# Patient Record
Sex: FEMALE | Race: BLACK OR AFRICAN AMERICAN | Employment: OTHER | URBAN - METROPOLITAN AREA
[De-identification: names, ages, dates, MRNs, and addresses within clinical notes are randomized per-mention and may not be internally consistent; named-entity substitution may affect disease eponyms.]

---

## 2017-05-01 ENCOUNTER — HOSPITAL ENCOUNTER (OUTPATIENT)
Dept: MAMMOGRAPHY | Age: 67
Discharge: HOME | End: 2017-05-01
Attending: NURSE PRACTITIONER
Payer: MEDICARE

## 2017-05-01 DIAGNOSIS — Z12.31 VISIT FOR SCREENING MAMMOGRAM: ICD-10-CM

## 2017-05-01 PROCEDURE — 77063 BREAST TOMOSYNTHESIS BI: CPT

## 2018-04-26 ENCOUNTER — HOSPITAL ENCOUNTER (INPATIENT)
Age: 68
LOS: 4 days | Discharge: HOME | DRG: 305 | End: 2018-04-30
Attending: EMERGENCY MEDICINE | Admitting: HOSPITALIST
Payer: MEDICARE

## 2018-04-26 DIAGNOSIS — I10 HTN (HYPERTENSION), MALIGNANT: Primary | ICD-10-CM

## 2018-04-26 DIAGNOSIS — I16.0 HYPERTENSIVE URGENCY: ICD-10-CM

## 2018-04-26 LAB
ALBUMIN SERPL-MCNC: 3.7 G/DL (ref 3.4–5)
ALBUMIN/GLOB SERPL: 0.9 {RATIO} (ref 0.8–1.7)
ALP SERPL-CCNC: 138 U/L (ref 45–117)
ALT SERPL-CCNC: 40 U/L (ref 13–56)
ANION GAP SERPL CALC-SCNC: 6 MMOL/L (ref 3–18)
AST SERPL-CCNC: 41 U/L (ref 15–37)
BASOPHILS # BLD: 0 K/UL (ref 0–0.1)
BASOPHILS NFR BLD: 0 % (ref 0–2)
BILIRUB SERPL-MCNC: 0.7 MG/DL (ref 0.2–1)
BUN SERPL-MCNC: 10 MG/DL (ref 7–18)
BUN/CREAT SERPL: 12 (ref 12–20)
CALCIUM SERPL-MCNC: 9 MG/DL (ref 8.5–10.1)
CHLORIDE SERPL-SCNC: 105 MMOL/L (ref 100–108)
CO2 SERPL-SCNC: 30 MMOL/L (ref 21–32)
CREAT SERPL-MCNC: 0.83 MG/DL (ref 0.6–1.3)
DIFFERENTIAL METHOD BLD: ABNORMAL
EOSINOPHIL # BLD: 0.2 K/UL (ref 0–0.4)
EOSINOPHIL NFR BLD: 4 % (ref 0–5)
ERYTHROCYTE [DISTWIDTH] IN BLOOD BY AUTOMATED COUNT: 13.9 % (ref 11.6–14.5)
GLOBULIN SER CALC-MCNC: 4.1 G/DL (ref 2–4)
GLUCOSE SERPL-MCNC: 112 MG/DL (ref 74–99)
HCT VFR BLD AUTO: 43 % (ref 35–45)
HGB BLD-MCNC: 14.4 G/DL (ref 12–16)
LYMPHOCYTES # BLD: 2 K/UL (ref 0.9–3.6)
LYMPHOCYTES NFR BLD: 31 % (ref 21–52)
MCH RBC QN AUTO: 26.5 PG (ref 24–34)
MCHC RBC AUTO-ENTMCNC: 33.5 G/DL (ref 31–37)
MCV RBC AUTO: 79 FL (ref 74–97)
MONOCYTES # BLD: 0.4 K/UL (ref 0.05–1.2)
MONOCYTES NFR BLD: 7 % (ref 3–10)
NEUTS SEG # BLD: 3.6 K/UL (ref 1.8–8)
NEUTS SEG NFR BLD: 58 % (ref 40–73)
PLATELET # BLD AUTO: 243 K/UL (ref 135–420)
PMV BLD AUTO: 10 FL (ref 9.2–11.8)
POTASSIUM SERPL-SCNC: 4.4 MMOL/L (ref 3.5–5.5)
PROT SERPL-MCNC: 7.8 G/DL (ref 6.4–8.2)
RBC # BLD AUTO: 5.44 M/UL (ref 4.2–5.3)
SODIUM SERPL-SCNC: 141 MMOL/L (ref 136–145)
TSH SERPL DL<=0.05 MIU/L-ACNC: 1.14 UIU/ML (ref 0.36–3.74)
WBC # BLD AUTO: 6.2 K/UL (ref 4.6–13.2)

## 2018-04-26 PROCEDURE — 80053 COMPREHEN METABOLIC PANEL: CPT | Performed by: EMERGENCY MEDICINE

## 2018-04-26 PROCEDURE — 85025 COMPLETE CBC W/AUTO DIFF WBC: CPT | Performed by: EMERGENCY MEDICINE

## 2018-04-26 PROCEDURE — 65660000004 HC RM CVT STEPDOWN

## 2018-04-26 PROCEDURE — 93005 ELECTROCARDIOGRAM TRACING: CPT

## 2018-04-26 PROCEDURE — 99285 EMERGENCY DEPT VISIT HI MDM: CPT

## 2018-04-26 PROCEDURE — 74011250637 HC RX REV CODE- 250/637: Performed by: EMERGENCY MEDICINE

## 2018-04-26 PROCEDURE — 84443 ASSAY THYROID STIM HORMONE: CPT | Performed by: EMERGENCY MEDICINE

## 2018-04-26 RX ORDER — SODIUM CHLORIDE 0.9 % (FLUSH) 0.9 %
5-10 SYRINGE (ML) INJECTION EVERY 8 HOURS
Status: DISCONTINUED | OUTPATIENT
Start: 2018-04-27 | End: 2018-04-30 | Stop reason: HOSPADM

## 2018-04-26 RX ORDER — SODIUM CHLORIDE 0.9 % (FLUSH) 0.9 %
5-10 SYRINGE (ML) INJECTION AS NEEDED
Status: DISCONTINUED | OUTPATIENT
Start: 2018-04-26 | End: 2018-04-30 | Stop reason: HOSPADM

## 2018-04-26 RX ORDER — HYDRALAZINE HYDROCHLORIDE 25 MG/1
25 TABLET, FILM COATED ORAL
Status: COMPLETED | OUTPATIENT
Start: 2018-04-26 | End: 2018-04-26

## 2018-04-26 RX ORDER — AMLODIPINE BESYLATE 5 MG/1
5 TABLET ORAL
Status: COMPLETED | OUTPATIENT
Start: 2018-04-26 | End: 2018-04-26

## 2018-04-26 RX ORDER — ONDANSETRON 2 MG/ML
4 INJECTION INTRAMUSCULAR; INTRAVENOUS
Status: DISCONTINUED | OUTPATIENT
Start: 2018-04-26 | End: 2018-04-30 | Stop reason: HOSPADM

## 2018-04-26 RX ORDER — HEPARIN SODIUM 5000 [USP'U]/ML
5000 INJECTION, SOLUTION INTRAVENOUS; SUBCUTANEOUS EVERY 8 HOURS
Status: DISCONTINUED | OUTPATIENT
Start: 2018-04-27 | End: 2018-04-30 | Stop reason: HOSPADM

## 2018-04-26 RX ORDER — HYDRALAZINE HYDROCHLORIDE 50 MG/1
100 TABLET, FILM COATED ORAL
Status: COMPLETED | OUTPATIENT
Start: 2018-04-26 | End: 2018-04-26

## 2018-04-26 RX ORDER — ACETAMINOPHEN 325 MG/1
650 TABLET ORAL
Status: DISCONTINUED | OUTPATIENT
Start: 2018-04-26 | End: 2018-04-30 | Stop reason: HOSPADM

## 2018-04-26 RX ORDER — HYDRALAZINE HYDROCHLORIDE 20 MG/ML
10 INJECTION INTRAMUSCULAR; INTRAVENOUS
Status: DISCONTINUED | OUTPATIENT
Start: 2018-04-26 | End: 2018-04-30 | Stop reason: HOSPADM

## 2018-04-26 RX ORDER — METOPROLOL TARTRATE 25 MG/1
25 TABLET, FILM COATED ORAL 2 TIMES DAILY
Status: DISCONTINUED | OUTPATIENT
Start: 2018-04-27 | End: 2018-04-27

## 2018-04-26 RX ORDER — LOSARTAN POTASSIUM 50 MG/1
100 TABLET ORAL DAILY
Status: DISCONTINUED | OUTPATIENT
Start: 2018-04-27 | End: 2018-04-30 | Stop reason: HOSPADM

## 2018-04-26 RX ADMIN — HYDRALAZINE HYDROCHLORIDE 100 MG: 50 TABLET, FILM COATED ORAL at 18:23

## 2018-04-26 RX ADMIN — Medication 10 ML: at 23:26

## 2018-04-26 RX ADMIN — HYDRALAZINE HYDROCHLORIDE 25 MG: 25 TABLET ORAL at 12:16

## 2018-04-26 RX ADMIN — AMLODIPINE BESYLATE 5 MG: 5 TABLET ORAL at 18:23

## 2018-04-26 RX ADMIN — HYDRALAZINE HYDROCHLORIDE 100 MG: 50 TABLET, FILM COATED ORAL at 14:07

## 2018-04-26 RX ADMIN — AMLODIPINE BESYLATE 5 MG: 5 TABLET ORAL at 19:54

## 2018-04-26 NOTE — ED TRIAGE NOTES
Pt complaining of high blood pressure. States she saw her PCP yesterday and was started on hydralizine. Pt also takes metoprolol 2 x a day  Pt also complaining of light headedness.

## 2018-04-26 NOTE — IP AVS SNAPSHOT
[address] [address] [place] Patient: Esther Hewitt MRN: [id number] A check victor m indicates which time of day the medication should be taken. My Medications START taking these medications Instructions Each Dose to Equal  
 Morning Noon Evening Bedtime  
 amLODIPine 10 mg tablet Commonly known as:  Zina Chamita Start taking on:  5/1/2018 Your last dose was: Your next dose is: Take 1 Tab by mouth daily. 10 mg  
    
   
   
   
  
 aspirin 81 mg chewable tablet Start taking on:  5/1/2018 Your last dose was: Your next dose is: Take 1 Tab by mouth daily. 81 mg  
    
   
   
   
  
 atorvastatin 40 mg tablet Commonly known as:  LIPITOR Your last dose was: Your next dose is: Take 1 Tab by mouth nightly. 40 mg  
    
   
   
   
  
 famotidine 20 mg tablet Commonly known as:  PEPCID Your last dose was: Your next dose is: Take 1 Tab by mouth two (2) times a day. 20 mg  
    
   
   
   
  
 hydrALAZINE 100 mg tablet Commonly known as:  APRESOLINE Your last dose was: Your next dose is: Take 1 Tab by mouth three (3) times daily. 100 mg  
    
   
   
   
  
 losartan 100 mg tablet Commonly known as:  COZAAR Start taking on:  5/1/2018 Your last dose was: Your next dose is: Take 1 Tab by mouth daily. 100 mg  
    
   
   
   
  
 minoxidil 2.5 mg tablet Commonly known as:  Carmelina Mojica Start taking on:  5/1/2018 Your last dose was: Your next dose is: Take 1 Tab by mouth daily. 2.5 mg  
    
   
   
   
  
 nitroglycerin 0.4 mg SL tablet Commonly known as:  NITROSTAT Your last dose was:     
   
Your next dose is:    
   
   
 1 Tab by SubLINGual route every five (5) minutes as needed for Chest Pain.  
 0.4 mg  
    
   
   
   
  
 spironolactone 25 mg tablet Commonly known as:  ALDACTONE Start taking on:  5/1/2018 Your last dose was: Your next dose is: Take 1 Tab by mouth daily. 25 mg Where to Get Your Medications These medications were sent to [address] [place] [address] [address] [facility].[place] [address] Phone:  [phone]  
  amLODIPine 10 mg tablet  
 aspirin 81 mg chewable tablet  
 atorvastatin 40 mg tablet  
 famotidine 20 mg tablet  
 hydrALAZINE 100 mg tablet  
 losartan 100 mg tablet  
 minoxidil 2.5 mg tablet  
 nitroglycerin 0.4 mg SL tablet  
 spironolactone 25 mg tablet

## 2018-04-26 NOTE — ED PROVIDER NOTES
EMERGENCY DEPARTMENT HISTORY AND PHYSICAL EXAM    10:58 AM      Date: 4/26/2018  Patient Name: Marta Jalloh    History of Presenting Illness     Chief Complaint   Patient presents with    Hypertension         History Provided By: Patient    Additional History (Context): Marta Jalloh is a 75 y.o. female with No significant past medical history who presents with intermittent global HTN for the past day. Pt reports that she went to the doctor yesterday and had a systolic blood pressure of over 200. She feels okay right now, but states that  she had experienced lightheadedness earlier today. The pt is afraid to take her medication because she believes it could exacerbate her HTN. Pt denies HA and visual disturbance. No other concerns, modifying factors, or symptoms were expressed by the pt at this time. PCP: Vikki Clark NP    Chief Complaint: HTN  Duration: 1 Days  Timing:  intermittent  Location: Global  Quality: N/a  Severity: N/a  Modifying Factors: None  Associated Symptoms: lightheadedness          Past History     Past Medical History:  No past medical history on file. Past Surgical History:  No past surgical history on file. Family History:  Family History   Problem Relation Age of Onset    Breast Cancer Maternal Aunt        Social History:  Social History   Substance Use Topics    Smoking status: Not on file    Smokeless tobacco: Not on file    Alcohol use Not on file       Allergies: Allergies   Allergen Reactions    Penicillin G Other (comments)    Sulfa (Sulfonamide Antibiotics) Other (comments)         Review of Systems     Review of Systems   Constitutional: Negative for diaphoresis and fever. Eyes: Negative for visual disturbance. Respiratory: Negative for shortness of breath. Cardiovascular: Negative for chest pain. Gastrointestinal: Negative for diarrhea, nausea and vomiting. Neurological: Positive for light-headedness. Negative for headaches.    All other systems reviewed and are negative. Physical Exam     Visit Vitals    BP (!) 220/71    Pulse 62    Temp 98 °F (36.7 °C)    Resp 18    Wt 106.1 kg (234 lb)    SpO2 99%       Physical Exam   Constitutional: She is oriented to person, place, and time. She appears well-developed. HENT:   Head: Normocephalic and atraumatic. Eyes: EOM are normal. Pupils are equal, round, and reactive to light. Neck: Normal range of motion. Neck supple. Cardiovascular: Normal rate, regular rhythm and normal heart sounds. Exam reveals no friction rub. No murmur heard. Pulmonary/Chest: Effort normal and breath sounds normal. No respiratory distress. She has no wheezes. Abdominal: Soft. She exhibits no distension. There is no tenderness. There is no rebound and no guarding. Musculoskeletal: Normal range of motion. Neurological: She is alert and oriented to person, place, and time. Skin: Skin is warm and dry. Psychiatric: She has a normal mood and affect. Her behavior is normal. Thought content normal.         Diagnostic Study Results      EKG shows sb at 48; nl axis. Nl int. No yfn/d no hypertrophy. TWI I L, v5/6  likley LVH. No prior EKG. Medical Decision Making      patient General care offices yesterday for hypertension. She notes that it is persistently high she said she felt mild lightheadedness earlier today. She has no chest pain or shortness of breath no abdominal pain. EKG shows a sinus bradycardia with likely some LVH but has not actually showing up on EKG. No priors for comparison. She had blood work done by primary yesterday     3:40 PM Progress note: Pt is feeling better after second dose of Hydralazine. 4:24 PM Consult: I discussed care with JAI Billings. It was a standard discussion including patient history, chief complaint, available diagnostic results, and predicted treatment course. I discussed with Dr. Mitchell Bustos and the similar results in her office.  She agrees with doubling hydralazine to 100 mg PO.    givne 25 hydralazine arrival; tooker her own losartan  givne 100mg po hydralazine   Then 5pm took her own 25 my hydralazine and her own 25mg lopressor. In addition to her losartan, hydralazine, and lopressor; pt was givne an additional 10 of norvasc and 225 (100x2 and 25 of hydralazine) will dw/ hospitalist for admission      Diagnosis     No diagnosis found. _______________________________    Attestations:  Scribe Attestation     Abhishek Grvoes acting as a scribe for and in the presence of Latrell Oconnor MD      April 26, 2018 at 10:58 AM       Provider Attestation:      I personally performed the services described in the documentation, reviewed the documentation, as recorded by the scribe in my presence, and it accurately and completely records my words and actions.  April 26, 2018 at 10:58 AM - Latrell Oconnor MD    _______________________________

## 2018-04-26 NOTE — ED NOTES
Patient remains hypertensive. Instructed by MD to give home meds which patient has. Will continue to monitor.

## 2018-04-26 NOTE — IP AVS SNAPSHOT
[address] [address] [place] Patient: Leopold Curd MRN: [id number] About your hospitalization You were admitted on:  April 26, 2018 You last received care in the:  [facility] You were discharged on:  April 30, 2018 Why you were hospitalized Your primary diagnosis was:  Not on File Your diagnoses also included:  Htn (Hypertension), Malignant, Hypertensive Urgency Follow-up Information Follow up With Details Comments Contact Info Refugio Marco, [address] [id number] [phone] Discharge Orders None A check victor m indicates which time of day the medication should be taken. My Medications START taking these medications Instructions Each Dose to Equal  
 Morning Noon Evening Bedtime  
 amLODIPine 10 mg tablet Commonly known as:  Earnest Russo Start taking on:  5/1/2018 Your last dose was: Your next dose is: Take 1 Tab by mouth daily. 10 mg  
    
   
   
   
  
 aspirin 81 mg chewable tablet Start taking on:  5/1/2018 Your last dose was: Your next dose is: Take 1 Tab by mouth daily. 81 mg  
    
   
   
   
  
 atorvastatin 40 mg tablet Commonly known as:  LIPITOR Your last dose was: Your next dose is: Take 1 Tab by mouth nightly. 40 mg  
    
   
   
   
  
 famotidine 20 mg tablet Commonly known as:  PEPCID Your last dose was: Your next dose is: Take 1 Tab by mouth two (2) times a day. 20 mg  
    
   
   
   
  
 hydrALAZINE 100 mg tablet Commonly known as:  APRESOLINE Your last dose was: Your next dose is: Take 1 Tab by mouth three (3) times daily. 100 mg  
    
   
   
   
  
 losartan 100 mg tablet Commonly known as:  COZAAR Start taking on:  5/1/2018 Your last dose was: Your next dose is: Take 1 Tab by mouth daily. 100 mg  
    
   
   
   
  
 minoxidil 2.5 mg tablet Commonly known as:  Rebeca Brooking Start taking on:  5/1/2018 Your last dose was: Your next dose is: Take 1 Tab by mouth daily. 2.5 mg  
    
   
   
   
  
 nitroglycerin 0.4 mg SL tablet Commonly known as:  NITROSTAT Your last dose was: Your next dose is:    
   
   
 1 Tab by SubLINGual route every five (5) minutes as needed for Chest Pain. 0.4 mg  
    
   
   
   
  
 spironolactone 25 mg tablet Commonly known as:  ALDACTONE Start taking on:  5/1/2018 Your last dose was: Your next dose is: Take 1 Tab by mouth daily. 25 mg Where to Get Your Medications These medications were sent to [address] [place] [address] Phone:  [phone]  
  amLODIPine 10 mg tablet  
 aspirin 81 mg chewable tablet  
 atorvastatin 40 mg tablet  
 famotidine 20 mg tablet  
 hydrALAZINE 100 mg tablet  
 losartan 100 mg tablet  
 minoxidil 2.5 mg tablet  
 nitroglycerin 0.4 mg SL tablet  
 spironolactone 25 mg tablet Discharge Instructions DISCHARGE SUMMARY from Nurse PATIENT INSTRUCTIONS: 
 
After general anesthesia or intravenous sedation, for 24 hours or while taking prescription Narcotics: · Limit your activities · Do not drive and operate hazardous machinery · Do not make important personal or business decisions · Do  not drink alcoholic beverages · If you have not urinated within 8 hours after discharge, please contact your surgeon on call. Report the following to your surgeon: 
· Excessive pain, swelling, redness or odor of or around the surgical area · Temperature over 100.5 · Nausea and vomiting lasting longer than 4 hours or if unable to take medications · Any signs of decreased circulation or nerve impairment to extremity: change in color, persistent  numbness, tingling, coldness or increase pain · Any questions What to do at Home: 
Recommended activity: Activity as tolerated If you experience any of the following symptoms chest pain, shortness of breath please follow up in ER 
 
*  Please give a list of your current medications to your Primary Care Provider. *  Please update this list whenever your medications are discontinued, doses are 
    changed, or new medications (including over-the-counter products) are added. *  Please carry medication information at all times in case of emergency situations. These are general instructions for a healthy lifestyle: No smoking/ No tobacco products/ Avoid exposure to second hand smoke Surgeon General's Warning:  Quitting smoking now greatly reduces serious risk to your health. Obesity, smoking, and sedentary lifestyle greatly increases your risk for illness A healthy diet, regular physical exercise & weight monitoring are important for maintaining a healthy lifestyle You may be retaining fluid if you have a history of heart failure or if you experience any of the following symptoms:  Weight gain of 3 pounds or more overnight or 5 pounds in a week, increased swelling in our hands or feet or shortness of breath while lying flat in bed. Please call your doctor as soon as you notice any of these symptoms; do not wait until your next office visit. Recognize signs and symptoms of STROKE: 
 
F-face looks uneven A-arms unable to move or move unevenly S-speech slurred or non-existent T-time-call 911 as soon as signs and symptoms begin-DO NOT go Back to bed or wait to see if you get better-TIME IS BRAIN. Warning Signs of HEART ATTACK Call 911 if you have these symptoms: 
? Chest discomfort.  Most heart attacks involve discomfort in the center of the chest that lasts more than a few minutes, or that goes away and comes back. It can feel like uncomfortable pressure, squeezing, fullness, or pain. ? Discomfort in other areas of the upper body. Symptoms can include pain or discomfort in one or both arms, the back, neck, jaw, or stomach. ? Shortness of breath with or without chest discomfort. ? Other signs may include breaking out in a cold sweat, nausea, or lightheadedness. Don't wait more than five minutes to call [address]! Fast action can save your life. Calling 911 is almost always the fastest way to get lifesaving treatment. Emergency Medical Services staff can begin treatment when they arrive  up to an hour sooner than if someone gets to the hospital by car. The discharge information has been reviewed with the patient. The patient verbalized understanding. Discharge medications reviewed with the patient High Blood Pressure: Care Instructions Your Care Instructions If your blood pressure is usually above 140/90, you have high blood pressure, or hypertension. That means the top number is 140 or higher or the bottom number is 90 or higher, or both. Despite what a lot of people think, high blood pressure usually doesn't cause headaches or make you feel dizzy or lightheaded. It usually has no symptoms. But it does increase your risk for heart attack, stroke, and kidney or eye damage. The higher your blood pressure, the more your risk increases. Your doctor will give you a goal for your blood pressure. Your goal will be based on your health and your age. An example of a goal is to keep your blood pressure below 140/90. Lifestyle changes, such as eating healthy and being active, are always important to help lower blood pressure. You might also take medicine to reach your blood pressure goal. 
Follow-up care is a key part of your treatment and safety.  Be sure to make and go to all appointments, and call your doctor if you are having problems. It's also a good idea to know your test results and keep a list of the medicines you take. How can you care for yourself at home? Medical treatment · If you stop taking your medicine, your blood pressure will go back up. You may take one or more types of medicine to lower your blood pressure. Be safe with medicines. Take your medicine exactly as prescribed. Call your doctor if you think you are having a problem with your medicine. · Talk to your doctor before you start taking aspirin every day. Aspirin can help certain people lower their risk of a heart attack or stroke. But taking aspirin isn't right for everyone, because it can cause serious bleeding. · See your doctor regularly. You may need to see the doctor more often at first or until your blood pressure comes down. · If you are taking blood pressure medicine, talk to your doctor before you take decongestants or anti-inflammatory medicine, such as ibuprofen. Some of these medicines can raise blood pressure. · Learn how to check your blood pressure at home. Lifestyle changes · Stay at a healthy weight. This is especially important if you put on weight around the waist. Losing even 10 pounds can help you lower your blood pressure. · If your doctor recommends it, get more exercise. Walking is a good choice. Bit by bit, increase the amount you walk every day. Try for at least 30 minutes on most days of the week. You also may want to swim, bike, or do other activities. · Avoid or limit alcohol. Talk to your doctor about whether you can drink any alcohol. · Try to limit how much sodium you eat to less than 2,300 milligrams (mg) a day. Your doctor may ask you to try to eat less than 1,500 mg a day. · Eat plenty of fruits (such as bananas and oranges), vegetables, legumes, whole grains, and low-fat dairy products. · Lower the amount of saturated fat in your diet. Saturated fat is found in animal products such as milk, cheese, and meat. Limiting these foods may help you lose weight and also lower your risk for heart disease. · Do not smoke. Smoking increases your risk for heart attack and stroke. If you need help quitting, talk to your doctor about stop-smoking programs and medicines. These can increase your chances of quitting for good. When should you call for help? Call 911 anytime you think you may need emergency care. This may mean having symptoms that suggest that your blood pressure is causing a serious heart or blood vessel problem. Your blood pressure may be over 180/110. ? For example, call 911 if: 
? · You have symptoms of a heart attack. These may include: ¨ Chest pain or pressure, or a strange feeling in the chest. 
¨ Sweating. ¨ Shortness of breath. ¨ Nausea or vomiting. ¨ Pain, pressure, or a strange feeling in the back, neck, jaw, or upper belly or in one or both shoulders or arms. ¨ Lightheadedness or sudden weakness. ¨ A fast or irregular heartbeat. ? · You have symptoms of a stroke. These may include: 
¨ Sudden numbness, tingling, weakness, or loss of movement in your face, arm, or leg, especially on only one side of your body. ¨ Sudden vision changes. ¨ Sudden trouble speaking. ¨ Sudden confusion or trouble understanding simple statements. ¨ Sudden problems with walking or balance. ¨ A sudden, severe headache that is different from past headaches. ? · You have severe back or belly pain. ?Do not wait until your blood pressure comes down on its own. Get help right away. ?Call your doctor now or seek immediate care if: 
? · Your blood pressure is much higher than normal (such as 180/110 or higher), but you don't have symptoms. ? · You think high blood pressure is causing symptoms, such as: ¨ Severe headache. ¨ Blurry vision. ?Watch closely for changes in your health, and be sure to contact your doctor if: 
? · Your blood pressure measures 140/90 or higher at least 2 times. That means the top number is 140 or higher or the bottom number is 90 or higher, or both. ? · You think you may be having side effects from your blood pressure medicine. ? · Your blood pressure is usually normal, but it goes above normal at least 2 times. Where can you learn more? Go to http://steffany-florida.info/. Enter F173 in the search box to learn more about \"High Blood Pressure: Care Instructions. \" Current as of: September 21, 2016 Content Version: 11.4 © 3489-0131 [employer]. Care instructions adapted under license by [employer] (which disclaims liability or warranty for this information). If you have questions about a medical condition or this instruction, always ask your healthcare professional. [address] any warranty or liability for your use of this information. and appropriate educational materials and side effects teaching were provided. ___________________________________________________________________________________________________________________________________ classmarketshart Announcement We are excited to announce that we are making your provider's discharge notes available to you in [employer]. You will see these notes when they are completed and signed by the physician that discharged you from your recent hospital stay. If you have any questions or concerns about any information you see in [employer], please call the Health Information Department where you were seen or reach out to your Primary Care Provider for more information about your plan of care. Introducing hospitals & HEALTH SERVICES! [address] introduces [employer] patient portal. Now you can access parts of your medical record, email your doctor's office, and request medication refills online. 1. In your internet browser, go to [email or web address] 2. Click on the First Time User? Click Here link in the Sign In box. You will see the New Member Sign Up page. 3. Enter your [employer] Access Code exactly as it appears below. You will not need to use this code after youve completed the sign-up process. If you do not sign up before the expiration date, you must request a new code. · [employer] Access Code: [email or web address] Expires: 7/25/2018 10:26 AM 
 
4. Enter the last four digits of your Social Security Number (xxxx) and Date of Birth (mm/dd/yyyy) as indicated and click Submit. You will be taken to the next sign-up page. 5. Create a [employer] ID. This will be your [employer] login ID and cannot be changed, so think of one that is secure and easy to remember. 6. Create a [employer] password. You can change your password at any time. 7. Enter your Password Reset Question and Answer. This can be used at a later time if you forget your password. 8. Enter your e-mail address. You will receive e-mail notification when new information is available in [address] 9. Click Sign Up. You can now view and download portions of your medical record. 10. Click the Download Summary menu link to download a portable copy of your medical information. If you have questions, please visit the Frequently Asked Questions section of the [employer] website. Remember, [employer] is NOT to be used for urgent needs. For medical emergencies, dial 911. Now available from your iPhone and Android! Introducing Chito Gallo As a [facility] patient, I wanted to make you aware of our electronic visit tool called Chito Gallo. [facility] 24/7 allows you to connect within minutes with a medical provider 24 hours a day, seven days a week via a mobile device or tablet or logging into a secure website from your computer. You can access Chito Gallo from anywhere in the [place]. A virtual visit might be right for you when you have a simple condition and feel like you just dont want to get out of bed, or cant get away from work for an appointment, when your regular [facility] provider is not available (evenings, weekends or holidays), or when youre out of town and need minor care. Electronic visits cost only $49 and if the [place] 24/[employer] provider determines a prescription is needed to treat your condition, one can be electronically transmitted to a nearby pharmacy*. Please take a moment to enroll today if you have not already done so. The enrollment process is free and takes just a few minutes. To enroll, please download the "[employer] germán to your tablet or phone, or visit [email or web address] to enroll on your computer. And, as an [address] [place] [address] patient with a [employer] account, the results of your visits will be scanned into your electronic medical record and your primary care provider will be able to view the scanned results. We urge you to continue to see your regular [facility] provider for your ongoing medical care. And while your primary care provider may not be the one available when you seek a Tilckchristopherfin virtual visit, the peace of mind you get from getting a real diagnosis real time can be priceless. For more information on Tilckchristopherfin, view our Frequently Asked Questions (FAQs) at [email or web address]. Sincerely, 
 
Baudilio Tan MD 
Chief Medical Officer Raz Vera *:  certain medications cannot be prescribed via [employer] Unresulted Labs-Please follow up with your PCP about these lab tests Order Current Status DUPLEX RENAL ART/EDIE BILATERAL Preliminary result NUCLEAR STRESS TEST Preliminary result Providers Seen During Your Hospitalization Provider Specialty Primary office phone Faith Francisco MD Emergency Medicine [phone] Paramjit Villarreal MD Internal Medicine [phone] Dmitriy Marie MD Internal Medicine [phone] Lois Chatman MD [facility] [phone] Your Primary Care Physician (PCP) Primary Care Physician Office Phone Office Ashley Haile [phone] You are allergic to the following Allergen Reactions Penicillin G Other (comments) Sulfa (Sulfonamide Antibiotics) Other (comments) Recent Documentation Height Weight BMI  
  
  
 1.676 m 104.9 kg 37.32 kg/m2 Emergency Contacts Name Discharge Info Relation Home Work Mobile Velvet Conde CAREGIVER [3] Mother [14] [phone] Patient Belongings The following personal items are in your possession at time of discharge: 
  Dental Appliances: None  Visual Aid: None      Home Medications: None   Jewelry: Watch  Clothing: At bedside    Other Valuables: Cell Phone, Eyeglasses Please provide this summary of care documentation to your next provider. Signatures-by signing, you are acknowledging that this After Visit Summary has been reviewed with you and you have received a copy. Patient Signature:  ____________________________________________________________ Date:  ____________________________________________________________  
  
Elysia Pa Provider Signature:  ____________________________________________________________ Date:  ____________________________________________________________

## 2018-04-26 NOTE — Clinical Note
Status[de-identified] Inpatient [101] Type of Bed: Stepdown [17] Inpatient Hospitalization Certified Necessary for the Following Reasons: 3. Patient receiving treatment that can only be provided in an inpatient setting (further clarification in H&P documentation) Admitting Diagnosis: HTN (hypertension), malignant [421331] Admitting Physician: Lynn Van [[id number]] Attending Physician: Lynn Van [[id number]] Estimated Length of Stay: 2 Midnights Discharge Plan[de-identified] Home with Office Follow-up

## 2018-04-27 LAB
ALBUMIN SERPL-MCNC: 4 G/DL (ref 3.4–5)
ALBUMIN/GLOB SERPL: 0.9 {RATIO} (ref 0.8–1.7)
ALP SERPL-CCNC: 145 U/L (ref 45–117)
ALT SERPL-CCNC: 39 U/L (ref 13–56)
ANION GAP SERPL CALC-SCNC: 7 MMOL/L (ref 3–18)
APTT PPP: 30.2 SEC (ref 23–36.4)
AST SERPL-CCNC: 27 U/L (ref 15–37)
ATRIAL RATE: 48 BPM
ATRIAL RATE: 78 BPM
BILIRUB SERPL-MCNC: 0.6 MG/DL (ref 0.2–1)
BUN SERPL-MCNC: 8 MG/DL (ref 7–18)
BUN/CREAT SERPL: 9 (ref 12–20)
CALCIUM SERPL-MCNC: 9.2 MG/DL (ref 8.5–10.1)
CALCULATED P AXIS, ECG09: 37 DEGREES
CALCULATED P AXIS, ECG09: 59 DEGREES
CALCULATED R AXIS, ECG10: -3 DEGREES
CALCULATED R AXIS, ECG10: 1 DEGREES
CALCULATED T AXIS, ECG11: 129 DEGREES
CALCULATED T AXIS, ECG11: 45 DEGREES
CHLORIDE SERPL-SCNC: 105 MMOL/L (ref 100–108)
CHOLEST SERPL-MCNC: 245 MG/DL
CK MB CFR SERPL CALC: 0.7 % (ref 0–4)
CK MB CFR SERPL CALC: 0.9 % (ref 0–4)
CK MB CFR SERPL CALC: 1.1 % (ref 0–4)
CK MB SERPL-MCNC: 1.5 NG/ML (ref 5–25)
CK MB SERPL-MCNC: 1.7 NG/ML (ref 5–25)
CK MB SERPL-MCNC: 2 NG/ML (ref 5–25)
CK SERPL-CCNC: 180 U/L (ref 26–192)
CK SERPL-CCNC: 194 U/L (ref 26–192)
CK SERPL-CCNC: 207 U/L (ref 26–192)
CO2 SERPL-SCNC: 29 MMOL/L (ref 21–32)
CREAT SERPL-MCNC: 0.94 MG/DL (ref 0.6–1.3)
DIAGNOSIS, 93000: NORMAL
DIAGNOSIS, 93000: NORMAL
ERYTHROCYTE [DISTWIDTH] IN BLOOD BY AUTOMATED COUNT: 14 % (ref 11.6–14.5)
GLOBULIN SER CALC-MCNC: 4.4 G/DL (ref 2–4)
GLUCOSE SERPL-MCNC: 166 MG/DL (ref 74–99)
HCT VFR BLD AUTO: 45.8 % (ref 35–45)
HDLC SERPL-MCNC: 48 MG/DL (ref 40–60)
HDLC SERPL: 5.1 {RATIO} (ref 0–5)
HGB BLD-MCNC: 15.3 G/DL (ref 12–16)
LDLC SERPL CALC-MCNC: 169.4 MG/DL (ref 0–100)
LIPID PROFILE,FLP: ABNORMAL
MCH RBC QN AUTO: 26.5 PG (ref 24–34)
MCHC RBC AUTO-ENTMCNC: 33.4 G/DL (ref 31–37)
MCV RBC AUTO: 79.4 FL (ref 74–97)
P-R INTERVAL, ECG05: 154 MS
P-R INTERVAL, ECG05: 168 MS
PLATELET # BLD AUTO: 283 K/UL (ref 135–420)
PMV BLD AUTO: 9.9 FL (ref 9.2–11.8)
POTASSIUM SERPL-SCNC: 3.5 MMOL/L (ref 3.5–5.5)
PROT SERPL-MCNC: 8.4 G/DL (ref 6.4–8.2)
Q-T INTERVAL, ECG07: 436 MS
Q-T INTERVAL, ECG07: 480 MS
QRS DURATION, ECG06: 80 MS
QRS DURATION, ECG06: 86 MS
QTC CALCULATION (BEZET), ECG08: 428 MS
QTC CALCULATION (BEZET), ECG08: 497 MS
RBC # BLD AUTO: 5.77 M/UL (ref 4.2–5.3)
SODIUM SERPL-SCNC: 141 MMOL/L (ref 136–145)
TRIGL SERPL-MCNC: 138 MG/DL (ref ?–150)
TROPONIN I SERPL-MCNC: 0.02 NG/ML (ref 0–0.04)
TROPONIN I SERPL-MCNC: <0.02 NG/ML (ref 0–0.04)
TROPONIN I SERPL-MCNC: <0.02 NG/ML (ref 0–0.04)
VENTRICULAR RATE, ECG03: 48 BPM
VENTRICULAR RATE, ECG03: 78 BPM
VLDLC SERPL CALC-MCNC: 27.6 MG/DL
WBC # BLD AUTO: 8.4 K/UL (ref 4.6–13.2)

## 2018-04-27 PROCEDURE — 82550 ASSAY OF CK (CPK): CPT | Performed by: HOSPITALIST

## 2018-04-27 PROCEDURE — 93005 ELECTROCARDIOGRAM TRACING: CPT

## 2018-04-27 PROCEDURE — 74011250636 HC RX REV CODE- 250/636: Performed by: HOSPITALIST

## 2018-04-27 PROCEDURE — 74011250637 HC RX REV CODE- 250/637: Performed by: HOSPITALIST

## 2018-04-27 PROCEDURE — 85027 COMPLETE CBC AUTOMATED: CPT | Performed by: HOSPITALIST

## 2018-04-27 PROCEDURE — 80053 COMPREHEN METABOLIC PANEL: CPT | Performed by: HOSPITALIST

## 2018-04-27 PROCEDURE — 85730 THROMBOPLASTIN TIME PARTIAL: CPT | Performed by: HOSPITALIST

## 2018-04-27 PROCEDURE — 36415 COLL VENOUS BLD VENIPUNCTURE: CPT | Performed by: HOSPITALIST

## 2018-04-27 PROCEDURE — 80061 LIPID PANEL: CPT | Performed by: HOSPITALIST

## 2018-04-27 PROCEDURE — 65660000004 HC RM CVT STEPDOWN

## 2018-04-27 PROCEDURE — C8929 TTE W OR WO FOL WCON,DOPPLER: HCPCS

## 2018-04-27 RX ORDER — AMLODIPINE BESYLATE 10 MG/1
10 TABLET ORAL DAILY
Status: DISCONTINUED | OUTPATIENT
Start: 2018-04-27 | End: 2018-04-30 | Stop reason: HOSPADM

## 2018-04-27 RX ORDER — NITROGLYCERIN 40 MG/100ML
10 INJECTION INTRAVENOUS CONTINUOUS
Status: CANCELLED | OUTPATIENT
Start: 2018-04-27

## 2018-04-27 RX ORDER — SPIRONOLACTONE 25 MG/1
25 TABLET ORAL DAILY
Status: DISCONTINUED | OUTPATIENT
Start: 2018-04-27 | End: 2018-04-30 | Stop reason: HOSPADM

## 2018-04-27 RX ORDER — GUAIFENESIN 100 MG/5ML
81 LIQUID (ML) ORAL DAILY
Status: DISCONTINUED | OUTPATIENT
Start: 2018-04-27 | End: 2018-04-30 | Stop reason: HOSPADM

## 2018-04-27 RX ORDER — HYDRALAZINE HYDROCHLORIDE 50 MG/1
50 TABLET, FILM COATED ORAL 3 TIMES DAILY
Status: DISCONTINUED | OUTPATIENT
Start: 2018-04-27 | End: 2018-04-27

## 2018-04-27 RX ORDER — FAMOTIDINE 20 MG/1
20 TABLET, FILM COATED ORAL 2 TIMES DAILY
Status: DISCONTINUED | OUTPATIENT
Start: 2018-04-27 | End: 2018-04-30 | Stop reason: HOSPADM

## 2018-04-27 RX ORDER — HYDRALAZINE HYDROCHLORIDE 50 MG/1
100 TABLET, FILM COATED ORAL 3 TIMES DAILY
Status: DISCONTINUED | OUTPATIENT
Start: 2018-04-27 | End: 2018-04-30 | Stop reason: HOSPADM

## 2018-04-27 RX ORDER — GUAIFENESIN 100 MG/5ML
81 LIQUID (ML) ORAL DAILY
Status: DISCONTINUED | OUTPATIENT
Start: 2018-04-28 | End: 2018-04-27

## 2018-04-27 RX ORDER — ATORVASTATIN CALCIUM 40 MG/1
40 TABLET, FILM COATED ORAL
Status: DISCONTINUED | OUTPATIENT
Start: 2018-04-27 | End: 2018-04-30 | Stop reason: HOSPADM

## 2018-04-27 RX ADMIN — FAMOTIDINE 20 MG: 20 TABLET ORAL at 20:30

## 2018-04-27 RX ADMIN — HYDRALAZINE HYDROCHLORIDE 100 MG: 50 TABLET, FILM COATED ORAL at 15:10

## 2018-04-27 RX ADMIN — Medication 10 ML: at 15:11

## 2018-04-27 RX ADMIN — LOSARTAN POTASSIUM 100 MG: 50 TABLET, FILM COATED ORAL at 09:05

## 2018-04-27 RX ADMIN — Medication 10 ML: at 07:12

## 2018-04-27 RX ADMIN — HYDRALAZINE HYDROCHLORIDE 10 MG: 20 INJECTION INTRAMUSCULAR; INTRAVENOUS at 16:13

## 2018-04-27 RX ADMIN — ONDANSETRON 4 MG: 2 INJECTION INTRAMUSCULAR; INTRAVENOUS at 16:59

## 2018-04-27 RX ADMIN — SPIRONOLACTONE 25 MG: 25 TABLET, FILM COATED ORAL at 15:14

## 2018-04-27 RX ADMIN — HYDRALAZINE HYDROCHLORIDE 10 MG: 20 INJECTION INTRAMUSCULAR; INTRAVENOUS at 09:05

## 2018-04-27 RX ADMIN — PERFLUTREN 2 ML: 6.52 INJECTION, SUSPENSION INTRAVENOUS at 10:52

## 2018-04-27 RX ADMIN — AMLODIPINE BESYLATE 10 MG: 10 TABLET ORAL at 11:33

## 2018-04-27 RX ADMIN — ASPIRIN 81 MG 81 MG: 81 TABLET ORAL at 20:30

## 2018-04-27 RX ADMIN — Medication 10 ML: at 22:26

## 2018-04-27 RX ADMIN — METOPROLOL TARTRATE 25 MG: 25 TABLET ORAL at 09:05

## 2018-04-27 RX ADMIN — HYDRALAZINE HYDROCHLORIDE 100 MG: 50 TABLET, FILM COATED ORAL at 22:25

## 2018-04-27 RX ADMIN — ATORVASTATIN CALCIUM 40 MG: 40 TABLET, FILM COATED ORAL at 22:25

## 2018-04-27 NOTE — PROGRESS NOTES
Pt feels better now, very min discomfort in epigastric area, + belching   No CP, no N/V now. BP improving, will cont current med's and monitor   D/w pt and family in detail at bedside   EKG  Show some ST depression in lateral leads, CE neg. Will get repeat EKG now, D/w cardio. Addendum:  Repeat EKG still show ST depression.      Visit Vitals    /74 (BP 1 Location: Right arm, BP Patient Position: At rest)    Pulse 83    Temp 98.2 °F (36.8 °C)    Resp 20    Ht 5' 6\" (1.676 m)    Wt 106.1 kg (234 lb)    SpO2 100%    BMI 37.77 kg/m2

## 2018-04-27 NOTE — CONSULTS
[facility] - Consult Note    Date of  Admission: 4/26/2018 10:29 AM     Primary Care Physician:  Liz Albrecht NP     Plan:     1) hypertensive urgency- BP improving on current meds. Will adjust meds as needed. 2) chest pain with mild st segment depression in inferolateral leads- possible ACS. Continue aspirin and statin- unable to tolerate BB due to bradycardia. Continue norvasc. Currently chest pain free. Will give one dose of lovenox sq and repeat cardiac enzymes. 3) dyslipidemia    Will f/u       Assessment:     Hospital Problems  Never Reviewed          Codes Class Noted POA    HTN (hypertension), malignant ICD-10-CM: I10  ICD-9-CM: 401.0  4/26/2018 Unknown        Hypertensive urgency ICD-10-CM: I16.0  ICD-9-CM: 401.9  4/26/2018 Unknown                   History of Present Illness: This patient has been seen and evaluated at the request of Brayden Melendrez for chest pain      This is a 75 y.o. female admitted for HTN (hypertension), malignant;HTN (hypertension), malignant*. Patient complains of:        Patient is a 75 yr old female being managed for hypertensive urgency- cardiology consult for chest pain. Today patient c/o of an episode of chest pain- retrosternal lasting few mins- no radiation. Currently chest pain free. EKG revealed mild st segment depression in inferolateral leads    Prior to admission she denies angina. Has mild dyspnea    No palpitations. No orthopnea. No cough. No pnd. No dizziness or lightheadedness. No fever or chills. No diaphoresis. No leg swelling. No recent syncopal episodes. No blood in stool or urine. No nausea or vomit. No recent CVA. Cardiac risk factors: htn, dyslipidemia         Past Medical History:   No past medical history on file.       Social History:     Social History     Social History    Marital status: SINGLE     Spouse name: N/A    Number of children: N/A    Years of education: N/A     Social History Main Topics    Smoking status: Not on file    Smokeless tobacco: Not on file    Alcohol use Not on file    Drug use: Not on file    Sexual activity: Not on file     Other Topics Concern    Not on file     Social History Narrative    No narrative on file        Family History:     Family History   Problem Relation Age of Onset    Breast Cancer Maternal Aunt         Medications: Allergies   Allergen Reactions    Penicillin G Other (comments)    Sulfa (Sulfonamide Antibiotics) Other (comments)        Current Facility-Administered Medications   Medication Dose Route Frequency    amLODIPine (NORVASC) tablet 10 mg  10 mg Oral DAILY    hydrALAZINE (APRESOLINE) tablet 100 mg  100 mg Oral TID    spironolactone (ALDACTONE) tablet 25 mg  25 mg Oral DAILY    famotidine (PEPCID) tablet 20 mg  20 mg Oral BID    aluminum-magnesium hydroxide (MAALOX) oral suspension 15 mL  15 mL Oral QID PRN    aspirin chewable tablet 81 mg  81 mg Oral DAILY    nitroglycerin (NITROBID) 2 % ointment 1 Inch  1 Inch Topical Q8H PRN    atorvastatin (LIPITOR) tablet 40 mg  40 mg Oral QHS    acetaminophen (TYLENOL) tablet 650 mg  650 mg Oral Q6H PRN    ondansetron (ZOFRAN) injection 4 mg  4 mg IntraVENous Q6H PRN    sodium chloride (NS) flush 5-10 mL  5-10 mL IntraVENous Q8H    sodium chloride (NS) flush 5-10 mL  5-10 mL IntraVENous PRN    heparin (porcine) injection 5,000 Units  5,000 Units SubCUTAneous Q8H    losartan (COZAAR) tablet 100 mg  100 mg Oral DAILY    hydrALAZINE (APRESOLINE) 20 mg/mL injection 10 mg  10 mg IntraVENous Q6H PRN        Review Of Systems:       A comprehensive review of systems was negative except for that written in the HPI.     Constitutional: No fever, no chills, no weight loss, no night sweats   HEENT: No epistaxis, no nasal drainage, no difficulty in swallowing, no redness in eyes  Respiratory:  negative for cough, sputum, hemoptysis, pleurisy/chest pain, wheezing, dyspnea on exertion or emphysema  Cardiovascular:  no dyspnea, no pnd, no claudication no palpitations, no chronic leg edema, no syncope  Gastrointestinal: no abd pain, no vomiting, no diarrhea, no bleeding symptoms  Genitourinary: No urinary symptoms or hematuria  Integument/breast: No ulcers or rashes  Musculoskeletal: no muscle pain, no weakness  Neurological: No focal weakness, no seizures, no headaches  Behvioral/Psych: No anxiety, no depression             Physical Exam:     Visit Vitals    /74 (BP 1 Location: Right arm, BP Patient Position: At rest)    Pulse 83    Temp 98.2 °F (36.8 °C)    Resp 20    Ht 5' 6\" (1.676 m)    Wt 106.1 kg (234 lb)    SpO2 100%    BMI 37.77 kg/m2     BP Readings from Last 3 Encounters:   04/27/18 167/74     Pulse Readings from Last 3 Encounters:   04/27/18 83     Wt Readings from Last 3 Encounters:   04/26/18 106.1 kg (234 lb)       General:  alert, cooperative, no distress, appears stated age  Skin: Warm and dry, acyanotic, normal color. Head: Normocephalic, atraumatic. Eyes: Sclerae anicteric, conjunctivae without injection. Neck:  nontender, no nuchal rigidity, no masses, no stridor, no carotid bruit, no JVD  Lungs:  clear to auscultation bilaterally, no rhonchi  Heart:  regular rate and rhythm, systolic murmur: holosystolic 2/6, blowing at 2nd right intercostal space  Abdomen:  abdomen is soft without significant tenderness, masses, organomegaly or guarding  Extremities:  extremities normal, atraumatic, no cyanosis or edema  Neurological: grossly intact. No focal abnormalities, moves all extremities well. Psychiatric Affect: The patient is awake, alert and oriented x3. Media Collie is interactive and appropriate.    Data Review:     Recent Results (from the past 48 hour(s))   EKG, 12 LEAD, INITIAL    Collection Time: 04/26/18 12:35 PM   Result Value Ref Range    Ventricular Rate 48 BPM    Atrial Rate 48 BPM    P-R Interval 168 ms    QRS Duration 80 ms    Q-T Interval 480 ms    QTC Calculation (Bezet) 428 ms    Calculated P Axis 59 degrees    Calculated R Axis 1 degrees    Calculated T Axis 129 degrees    Diagnosis       Marked sinus bradycardia  T wave abnormality, consider lateral ischemia  Abnormal ECG  No previous ECGs available  Confirmed by Martha Bass ([id number]) on 4/27/2018 8:21:59 AM     TSH 3RD GENERATION    Collection Time: 04/26/18  2:55 PM   Result Value Ref Range    TSH 1.14 0.36 - 3.74 uIU/mL   CBC WITH AUTOMATED DIFF    Collection Time: 04/26/18  2:55 PM   Result Value Ref Range    WBC 6.2 4.6 - 13.2 K/uL    RBC 5.44 (H) 4.20 - 5.30 M/uL    HGB 14.4 12.0 - 16.0 g/dL    HCT 43.0 35.0 - 45.0 %    MCV 79.0 74.0 - 97.0 FL    MCH 26.5 24.0 - 34.0 PG    MCHC 33.5 31.0 - 37.0 g/dL    RDW 13.9 11.6 - 14.5 %    PLATELET 164 157 - 584 K/uL    MPV 10.0 9.2 - 11.8 FL    NEUTROPHILS 58 40 - 73 %    LYMPHOCYTES 31 21 - 52 %    MONOCYTES 7 3 - 10 %    EOSINOPHILS 4 0 - 5 %    BASOPHILS 0 0 - 2 %    ABS. NEUTROPHILS 3.6 1.8 - 8.0 K/UL    ABS. LYMPHOCYTES 2.0 0.9 - 3.6 K/UL    ABS. MONOCYTES 0.4 0.05 - 1.2 K/UL    ABS. EOSINOPHILS 0.2 0.0 - 0.4 K/UL    ABS. BASOPHILS 0.0 0.0 - 0.1 K/UL    DF AUTOMATED     METABOLIC PANEL, COMPREHENSIVE    Collection Time: 04/26/18  2:55 PM   Result Value Ref Range    Sodium 141 136 - 145 mmol/L    Potassium 4.4 3.5 - 5.5 mmol/L    Chloride 105 100 - 108 mmol/L    CO2 30 21 - 32 mmol/L    Anion gap 6 3.0 - 18 mmol/L    Glucose 112 (H) 74 - 99 mg/dL    BUN 10 7.0 - 18 MG/DL    Creatinine 0.83 0.6 - 1.3 MG/DL    BUN/Creatinine ratio 12 12 - 20      GFR est AA >60 >60 ml/min/1.73m2    GFR est non-AA >60 >60 ml/min/1.73m2    Calcium 9.0 8.5 - 10.1 MG/DL    Bilirubin, total 0.7 0.2 - 1.0 MG/DL    ALT (SGPT) 40 13 - 56 U/L    AST (SGOT) 41 (H) 15 - 37 U/L    Alk.  phosphatase 138 (H) 45 - 117 U/L    Protein, total 7.8 6.4 - 8.2 g/dL    Albumin 3.7 3.4 - 5.0 g/dL    Globulin 4.1 (H) 2.0 - 4.0 g/dL    A-G Ratio 0.9 0.8 - 1.7     METABOLIC PANEL, COMPREHENSIVE    Collection Time: 04/27/18  1:10 AM   Result Value Ref Range    Sodium 141 136 - 145 mmol/L    Potassium 3.5 3.5 - 5.5 mmol/L    Chloride 105 100 - 108 mmol/L    CO2 29 21 - 32 mmol/L    Anion gap 7 3.0 - 18 mmol/L    Glucose 166 (H) 74 - 99 mg/dL    BUN 8 7.0 - 18 MG/DL    Creatinine 0.94 0.6 - 1.3 MG/DL    BUN/Creatinine ratio 9 (L) 12 - 20      GFR est AA >60 >60 ml/min/1.73m2    GFR est non-AA 59 (L) >60 ml/min/1.73m2    Calcium 9.2 8.5 - 10.1 MG/DL    Bilirubin, total 0.6 0.2 - 1.0 MG/DL    ALT (SGPT) 39 13 - 56 U/L    AST (SGOT) 27 15 - 37 U/L    Alk.  phosphatase 145 (H) 45 - 117 U/L    Protein, total 8.4 (H) 6.4 - 8.2 g/dL    Albumin 4.0 3.4 - 5.0 g/dL    Globulin 4.4 (H) 2.0 - 4.0 g/dL    A-G Ratio 0.9 0.8 - 1.7     LIPID PANEL    Collection Time: 04/27/18  1:10 AM   Result Value Ref Range    LIPID PROFILE          Cholesterol, total 245 (H) <200 MG/DL    Triglyceride 138 <150 MG/DL    HDL Cholesterol 48 40 - 60 MG/DL    LDL, calculated 169.4 (H) 0 - 100 MG/DL    VLDL, calculated 27.6 MG/DL    CHOL/HDL Ratio 5.1 (H) 0 - 5.0     CBC W/O DIFF    Collection Time: 04/27/18  1:10 AM   Result Value Ref Range    WBC 8.4 4.6 - 13.2 K/uL    RBC 5.77 (H) 4.20 - 5.30 M/uL    HGB 15.3 12.0 - 16.0 g/dL    HCT 45.8 (H) 35.0 - 45.0 %    MCV 79.4 74.0 - 97.0 FL    MCH 26.5 24.0 - 34.0 PG    MCHC 33.4 31.0 - 37.0 g/dL    RDW 14.0 11.6 - 14.5 %    PLATELET 590 973 - 635 K/uL    MPV 9.9 9.2 - 11.8 FL   PTT    Collection Time: 04/27/18  1:10 AM   Result Value Ref Range    aPTT 30.2 23.0 - 36.4 SEC   CARDIAC PANEL,(CK, CKMB & TROPONIN)    Collection Time: 04/27/18  1:10 AM   Result Value Ref Range     (H) 26 - 192 U/L    CK - MB 1.5 <3.6 ng/ml    CK-MB Index 0.7 0.0 - 4.0 %    Troponin-I, Qt. <0.02 0.0 - 0.045 NG/ML   CARDIAC PANEL,(CK, CKMB & TROPONIN)    Collection Time: 04/27/18 11:51 AM   Result Value Ref Range     (H) 26 - 192 U/L    CK - MB 1.7 <3.6 ng/ml    CK-MB Index 0.9 0.0 - 4.0 %    Troponin-I, Qt. <0.02 0.0 - 0.045 NG/ML   EKG, 12 LEAD, SUBSEQUENT Collection Time: 04/27/18  4:48 PM   Result Value Ref Range    Ventricular Rate 78 BPM    Atrial Rate 78 BPM    P-R Interval 154 ms    QRS Duration 86 ms    Q-T Interval 436 ms    QTC Calculation (Bezet) 497 ms    Calculated P Axis 37 degrees    Calculated R Axis -3 degrees    Calculated T Axis 45 degrees    Diagnosis       Normal sinus rhythm  Nonspecific ST and T wave abnormality  Prolonged QT  Abnormal ECG  When compared with ECG of 26-APR-2018 12:35,  Vent. rate has increased BY  30 BPM  ST now depressed in Lateral leads  T wave inversion less evident in Lateral leads  QT has lengthened  Confirmed by Donna Moon ([id number]) on 4/27/2018 5:47:59 PM     EKG, 12 LEAD, INITIAL    Collection Time: 04/27/18  6:22 PM   Result Value Ref Range    Ventricular Rate 80 BPM    Atrial Rate 80 BPM    P-R Interval 152 ms    QRS Duration 86 ms    Q-T Interval 430 ms    QTC Calculation (Bezet) 495 ms    Calculated P Axis 51 degrees    Calculated R Axis 0 degrees    Calculated T Axis 45 degrees    Diagnosis       Normal sinus rhythm  Nonspecific ST and T wave abnormality  Prolonged QT  Abnormal ECG  When compared with ECG of 27-APR-2018 16:48,  No significant change was found           Intake/Output Summary (Last 24 hours) at 04/27/18 1931  Last data filed at 04/27/18 1217   Gross per 24 hour   Intake              480 ml   Output                0 ml   Net              480 ml       Cardiographics:     ECG: sinus rhythm with mild st segment depression in inferolateral leads    Echocardiogram: 04/2018:  Left ventricle: Size was normal. Systolic function was by visual assessment. Ejection fraction was estimated in the  range of 55 % to 60 %. Suboptimal endocardial visualization limits wall   motion analysis. Wall thickness was mildly  increased. Right ventricle: Systolic pressure was within the normal range. Estimated   peak pressure was 20 mmHg.       Signed By: Marcos Juarez MD     April 27, 2018

## 2018-04-27 NOTE — PROGRESS NOTES
Problem: Hypertension  Goal: *Blood pressure within specified parameters  Outcome: Progressing Towards Goal  Vitals q4h. Oral and bp meds given. Will continue to monitor.

## 2018-04-27 NOTE — PROGRESS NOTES
[facility] Hospitalist Group  Progress Note    Patient: Ni Smoker Age: 75 y.o. : 1950 MR#: [id number] SSN: [id number]  Date/Time: 2018     Subjective: pt feels ok, min lightheadedness. No CP or SOB. No weakness or numbness. Per pt her BP is usually high when see visits PCP but doesn't check at home. Pt not sure if her BP always runs high. Assessment/Plan:   1. Hypertensive Urgency: BP still in 200's, will cont ABR and BB, add Norvasc and hydralazine. Monitor BP if doesn't improve then consider nicardipine drip. Will get renal duplex. Echo pending. Will get CE. 2. Dyslipidemia: continue Zocor     D/w pt and daughter at bedside     Case discussed with:  [x]Patient  [x]Family  [x]Nursing  []Case Management  DVT Prophylaxis:  []Lovenox  [x]Hep SQ  []SCDs  []Coumadin   []On Heparin gtt    Objective:   VS:   Visit Vitals    BP (!) 218/82 (BP 1 Location: Left arm, BP Patient Position: At rest)    Pulse 63    Temp 97.9 °F (36.6 °C)    Resp 20    Wt 106.1 kg (234 lb)    SpO2 95%      Tmax/24hrs: Temp (24hrs), Av.1 °F (36.7 °C), Min:97.9 °F (36.6 °C), Max:98.4 °F (36.9 °C)  IOBRIEF  Intake/Output Summary (Last 24 hours) at 18 1044  Last data filed at 18 0859   Gross per 24 hour   Intake              240 ml   Output                0 ml   Net              240 ml       General:  Alert, cooperative, no acute distress    HEENT: PERRLA, anicteric sclerae. Pulmonary:  CTA Bilaterally. No Wheezing/Rhonchi/Rales. Cardiovascular: Regular rate and Rhythm. GI:  Soft, Non distended, Non tender. + Bowel sounds. Extremities:  No edema, cyanosis, clubbing. Psych: Good insight. Not anxious or agitated. Neurologic: Alert and oriented X 3. No acute neuro deficits.   Additional:    Medications:   Current Facility-Administered Medications   Medication Dose Route Frequency    perflutren lipid microspheres (DEFINITY) contrast injection 2 mL  2 mL IntraVENous CARD ONCE  acetaminophen (TYLENOL) tablet 650 mg  650 mg Oral Q6H PRN    ondansetron (ZOFRAN) injection 4 mg  4 mg IntraVENous Q6H PRN    sodium chloride (NS) flush 5-10 mL  5-10 mL IntraVENous Q8H    sodium chloride (NS) flush 5-10 mL  5-10 mL IntraVENous PRN    heparin (porcine) injection 5,000 Units  5,000 Units SubCUTAneous Q8H    losartan (COZAAR) tablet 100 mg  100 mg Oral DAILY    metoprolol tartrate (LOPRESSOR) tablet 25 mg  25 mg Oral BID    hydrALAZINE (APRESOLINE) 20 mg/mL injection 10 mg  10 mg IntraVENous Q6H PRN       Labs:    Recent Results (from the past 24 hour(s))   EKG, 12 LEAD, INITIAL    Collection Time: 04/26/18 12:35 PM   Result Value Ref Range    Ventricular Rate 48 BPM    Atrial Rate 48 BPM    P-R Interval 168 ms    QRS Duration 80 ms    Q-T Interval 480 ms    QTC Calculation (Bezet) 428 ms    Calculated P Axis 59 degrees    Calculated R Axis 1 degrees    Calculated T Axis 129 degrees    Diagnosis       Marked sinus bradycardia  T wave abnormality, consider lateral ischemia  Abnormal ECG  No previous ECGs available  Confirmed by Zander Spencer [id number]) on 4/27/2018 8:21:59 AM     TSH 3RD GENERATION    Collection Time: 04/26/18  2:55 PM   Result Value Ref Range    TSH 1.14 0.36 - 3.74 uIU/mL   CBC WITH AUTOMATED DIFF    Collection Time: 04/26/18  2:55 PM   Result Value Ref Range    WBC 6.2 4.6 - 13.2 K/uL    RBC 5.44 (H) 4.20 - 5.30 M/uL    HGB 14.4 12.0 - 16.0 g/dL    HCT 43.0 35.0 - 45.0 %    MCV 79.0 74.0 - 97.0 FL    MCH 26.5 24.0 - 34.0 PG    MCHC 33.5 31.0 - 37.0 g/dL    RDW 13.9 11.6 - 14.5 %    PLATELET 024 605 - 528 K/uL    MPV 10.0 9.2 - 11.8 FL    NEUTROPHILS 58 40 - 73 %    LYMPHOCYTES 31 21 - 52 %    MONOCYTES 7 3 - 10 %    EOSINOPHILS 4 0 - 5 %    BASOPHILS 0 0 - 2 %    ABS. NEUTROPHILS 3.6 1.8 - 8.0 K/UL    ABS. LYMPHOCYTES 2.0 0.9 - 3.6 K/UL    ABS. MONOCYTES 0.4 0.05 - 1.2 K/UL    ABS. EOSINOPHILS 0.2 0.0 - 0.4 K/UL    ABS.  BASOPHILS 0.0 0.0 - 0.1 K/UL    DF AUTOMATED METABOLIC PANEL, COMPREHENSIVE    Collection Time: 04/26/18  2:55 PM   Result Value Ref Range    Sodium 141 136 - 145 mmol/L    Potassium 4.4 3.5 - 5.5 mmol/L    Chloride 105 100 - 108 mmol/L    CO2 30 21 - 32 mmol/L    Anion gap 6 3.0 - 18 mmol/L    Glucose 112 (H) 74 - 99 mg/dL    BUN 10 7.0 - 18 MG/DL    Creatinine 0.83 0.6 - 1.3 MG/DL    BUN/Creatinine ratio 12 12 - 20      GFR est AA >60 >60 ml/min/1.73m2    GFR est non-AA >60 >60 ml/min/1.73m2    Calcium 9.0 8.5 - 10.1 MG/DL    Bilirubin, total 0.7 0.2 - 1.0 MG/DL    ALT (SGPT) 40 13 - 56 U/L    AST (SGOT) 41 (H) 15 - 37 U/L    Alk. phosphatase 138 (H) 45 - 117 U/L    Protein, total 7.8 6.4 - 8.2 g/dL    Albumin 3.7 3.4 - 5.0 g/dL    Globulin 4.1 (H) 2.0 - 4.0 g/dL    A-G Ratio 0.9 0.8 - 1.7     METABOLIC PANEL, COMPREHENSIVE    Collection Time: 04/27/18  1:10 AM   Result Value Ref Range    Sodium 141 136 - 145 mmol/L    Potassium 3.5 3.5 - 5.5 mmol/L    Chloride 105 100 - 108 mmol/L    CO2 29 21 - 32 mmol/L    Anion gap 7 3.0 - 18 mmol/L    Glucose 166 (H) 74 - 99 mg/dL    BUN 8 7.0 - 18 MG/DL    Creatinine 0.94 0.6 - 1.3 MG/DL    BUN/Creatinine ratio 9 (L) 12 - 20      GFR est AA >60 >60 ml/min/1.73m2    GFR est non-AA 59 (L) >60 ml/min/1.73m2    Calcium 9.2 8.5 - 10.1 MG/DL    Bilirubin, total 0.6 0.2 - 1.0 MG/DL    ALT (SGPT) 39 13 - 56 U/L    AST (SGOT) 27 15 - 37 U/L    Alk.  phosphatase 145 (H) 45 - 117 U/L    Protein, total 8.4 (H) 6.4 - 8.2 g/dL    Albumin 4.0 3.4 - 5.0 g/dL    Globulin 4.4 (H) 2.0 - 4.0 g/dL    A-G Ratio 0.9 0.8 - 1.7     LIPID PANEL    Collection Time: 04/27/18  1:10 AM   Result Value Ref Range    LIPID PROFILE          Cholesterol, total 245 (H) <200 MG/DL    Triglyceride 138 <150 MG/DL    HDL Cholesterol 48 40 - 60 MG/DL    LDL, calculated 169.4 (H) 0 - 100 MG/DL    VLDL, calculated 27.6 MG/DL    CHOL/HDL Ratio 5.1 (H) 0 - 5.0     CBC W/O DIFF    Collection Time: 04/27/18  1:10 AM   Result Value Ref Range    WBC 8.4 4.6 - 13.2 K/uL    RBC 5.77 (H) 4.20 - 5.30 M/uL    HGB 15.3 12.0 - 16.0 g/dL    HCT 45.8 (H) 35.0 - 45.0 %    MCV 79.4 74.0 - 97.0 FL    MCH 26.5 24.0 - 34.0 PG    MCHC 33.4 31.0 - 37.0 g/dL    RDW 14.0 11.6 - 14.5 %    PLATELET 286 892 - 367 K/uL    MPV 9.9 9.2 - 11.8 FL   PTT    Collection Time: 04/27/18  1:10 AM   Result Value Ref Range    aPTT 30.2 23.0 - 36.4 SEC       Signed By: Teofilo Faith MD     April 27, 2018

## 2018-04-27 NOTE — PROGRESS NOTES
Problem: Hypertension  Goal: *Blood pressure within specified parameters  Outcome: Progressing Towards Goal  Oral and IV BP meds given. Will continue to monitor.

## 2018-04-27 NOTE — ROUTINE PROCESS
Bedside shift change report given to Angela Lofton Rn (oncoming nurse) by Jonathon Landis RN (offgoing nurse). Report included the following information SBAR, Kardex, Intake/Output, Recent Results and Cardiac Rhythm NSR.

## 2018-04-27 NOTE — PROGRESS NOTES
conducted an initial consultation and Spiritual Assessment for Audrey Talavera, who is a 75 y.o.,female. Patients Primary Language is: [place]. According to the patients EMR Church Affiliation is: No Pentecostalism. The reason the Patient came to the hospital is:   Patient Active Problem List    Diagnosis Date Noted    HTN (hypertension), malignant 04/26/2018    Hypertensive urgency 04/26/2018        The  provided the following Interventions:  Initiated a relationship of care and support to patient and her mother, Alec Berman, and daughter, Candie Munguia. Explored issues of salvador, belief, spirituality and Protestant/ritual needs while hospitalized. Listened empathically to patient who shared about her \"heavy burdens. \"  listened and understood she was carrying every bodies troubles and problem the continually share.  commented patient for her compassion and desire to help but  Instructed her about the importance of \"letting go to God\" other's problems and hot burdening her self down. After some discussion she got the point and states she will learn to practice that. Provided information about Spiritual Care Services. Scripture reading with comments, Numbers 6:24-26, offered prayer and assurance of continued prayers on patient's behalf. The following outcomes where achieved:  Patient shared limited information about both their medical narrative and spiritual journey/beliefs.  confirmed Patient's Church Affiliation. Patient processed feeling about current hospitalization. Patient expressed gratitude for 's visit. Assessment:  Patient does not have any Protestant/cultural needs that will affect patients preferences in health care. There are no spiritual or Protestant issues which require intervention at this time. Plan:  Chaplains will continue to follow and will provide pastoral care on an as needed/requested basis.    recommends bedside caregivers page  on duty if patient shows signs of acute spiritual or emotional distress.       Nikos Valdez, [address] [place] [address]  Spiritual Care  [phone]

## 2018-04-27 NOTE — PROGRESS NOTES
RN called me that BP still in 180's to 190's and pt felt chest discomfort. No CP. Mild dizziness. EKG no changes, will get trop and cardio eval. D/w Dr. Sonali Bello. Plan was to start Cardene drip and transfer to ICU, did d/w ICU team too. Meanwhile BP down to 177/80 and pt feels better. ICU and IV Cardene drip canceled. Will cont PO and IV PRN med's and monitor in step down. If BP trends up then will consider to start drip and transfer to ICU.

## 2018-04-27 NOTE — H&P
HISTORY & PHYSICAL            Patient: Ni Gaffney MRN: [id number]  CSN: [id number]    YOB: 1950  Age: 75 y.o. Sex: female    DOA: 4/26/2018 LOS:  LOS: 1 day        DOA: 4/26/2018        Assessment/Plan     Active Problems:    HTN (hypertension), malignant (4/26/2018)      Hypertensive urgency (4/26/2018)        Plan:  1. Hypertensive Urgency - Has taken her home meds, Took hydralazine 25mg at home , Hydralazine 100mg PO x2 given in the ER - BP still running high - SBP >200 - pt is asymptomatic -- Hard to determine if the pt has been running an elevated BP for a prolonged time period - if that's the case then we would like to bring it down slowly -- she has been given a significant amount of Hydralazine & also Norvasc 10mg - is currently Bradycardic & has taken Lopressor at home  - will admit to SDU , monitor overnight -- Echo in AM , trop q6hrly x3   2. H/o HTN - resume losartan , Metoprolol - monitor BP   3. H/o HLPD - continue Zocor   DVT Px - Heparin   FC           Severity of Signs & Symptoms -- Moderate  Risk of adverse events -- Moderate  Current Medical Rx Plan - As Above   Patient history & comorbidities - Per HPI  Discharge Plan -- Home            HPI:     Ni Gaffney is a 75 y.o. female who is being admitted 2y to hypertensive urgency -- pt mentions that she went to see her PCP yesterday , was noted to have elevated BP - is currently on Metoprolol & Losartan which she has been taking regularly -- says her PCP gave her 25 mg hydralazine in the office & added 25mg PO bid hydralazine -- pt mentions when she woke up this AM she felt light headed , took her BP at home was noted to be high , took her Am meds & came to the ER   Er eval - pt received 200mg PO hydralazine but her BP still remains high - not sure why she wasn't given IV hydralazine in the ER   BP is still elevated - pt is asymptomatic - will admit for further eval.     No past medical history on file.     No past surgical history on file. Family History   Problem Relation Age of Onset    Breast Cancer Maternal Aunt        Social History     Social History    Marital status: SINGLE     Spouse name: N/A    Number of children: N/A    Years of education: N/A     Social History Main Topics    Smoking status: Not on file    Smokeless tobacco: Not on file    Alcohol use Not on file    Drug use: Not on file    Sexual activity: Not on file     Other Topics Concern    Not on file     Social History Narrative    No narrative on file       Prior to Admission medications    Not on File       Allergies   Allergen Reactions    Penicillin G Other (comments)    Sulfa (Sulfonamide Antibiotics) Other (comments)       Review of Systems  A comprehensive review of systems was negative except for that written in the History of Present Illness. Physical Exam:      Visit Vitals    /76    Pulse 65    Temp 98.2 °F (36.8 °C)    Resp 20    Wt 106.1 kg (234 lb)    SpO2 99%       Physical Exam:    Gen: In general, this is a well nourished female in no acute distress  HEENT: Sclerae nonicteric. Oral mucous membranes moist. Dentition normal  Neck: Supple with midline trachea. CV: RRR without murmur or rub appreciated. Resp:Respirations are unlabored without use of accessory muscles. Lung fields bilaterally without wheezes or rhonchi. Abd: Soft, nontender, nondistended. Extrem: Extremities are warm, without cyanosis or clubbing. No pitting pretibial edema. Skin: Warm, no visible rashes. Neuro: Patient is alert, oriented, and cooperative. No obvious focal defects. Moves all 4 extremities.     Labs Reviewed:    Recent Results (from the past 24 hour(s))   EKG, 12 LEAD, INITIAL    Collection Time: 04/26/18 12:35 PM   Result Value Ref Range    Ventricular Rate 48 BPM    Atrial Rate 48 BPM    P-R Interval 168 ms    QRS Duration 80 ms    Q-T Interval 480 ms    QTC Calculation (Bezet) 428 ms    Calculated P Axis 59 degrees Calculated R Axis 1 degrees    Calculated T Axis 129 degrees    Diagnosis       Marked sinus bradycardia  T wave abnormality, consider lateral ischemia  Abnormal ECG  No previous ECGs available     TSH 3RD GENERATION    Collection Time: 04/26/18  2:55 PM   Result Value Ref Range    TSH 1.14 0.36 - 3.74 uIU/mL   CBC WITH AUTOMATED DIFF    Collection Time: 04/26/18  2:55 PM   Result Value Ref Range    WBC 6.2 4.6 - 13.2 K/uL    RBC 5.44 (H) 4.20 - 5.30 M/uL    HGB 14.4 12.0 - 16.0 g/dL    HCT 43.0 35.0 - 45.0 %    MCV 79.0 74.0 - 97.0 FL    MCH 26.5 24.0 - 34.0 PG    MCHC 33.5 31.0 - 37.0 g/dL    RDW 13.9 11.6 - 14.5 %    PLATELET 458 855 - 371 K/uL    MPV 10.0 9.2 - 11.8 FL    NEUTROPHILS 58 40 - 73 %    LYMPHOCYTES 31 21 - 52 %    MONOCYTES 7 3 - 10 %    EOSINOPHILS 4 0 - 5 %    BASOPHILS 0 0 - 2 %    ABS. NEUTROPHILS 3.6 1.8 - 8.0 K/UL    ABS. LYMPHOCYTES 2.0 0.9 - 3.6 K/UL    ABS. MONOCYTES 0.4 0.05 - 1.2 K/UL    ABS. EOSINOPHILS 0.2 0.0 - 0.4 K/UL    ABS. BASOPHILS 0.0 0.0 - 0.1 K/UL    DF AUTOMATED     METABOLIC PANEL, COMPREHENSIVE    Collection Time: 04/26/18  2:55 PM   Result Value Ref Range    Sodium 141 136 - 145 mmol/L    Potassium 4.4 3.5 - 5.5 mmol/L    Chloride 105 100 - 108 mmol/L    CO2 30 21 - 32 mmol/L    Anion gap 6 3.0 - 18 mmol/L    Glucose 112 (H) 74 - 99 mg/dL    BUN 10 7.0 - 18 MG/DL    Creatinine 0.83 0.6 - 1.3 MG/DL    BUN/Creatinine ratio 12 12 - 20      GFR est AA >60 >60 ml/min/1.73m2    GFR est non-AA >60 >60 ml/min/1.73m2    Calcium 9.0 8.5 - 10.1 MG/DL    Bilirubin, total 0.7 0.2 - 1.0 MG/DL    ALT (SGPT) 40 13 - 56 U/L    AST (SGOT) 41 (H) 15 - 37 U/L    Alk.  phosphatase 138 (H) 45 - 117 U/L    Protein, total 7.8 6.4 - 8.2 g/dL    Albumin 3.7 3.4 - 5.0 g/dL    Globulin 4.1 (H) 2.0 - 4.0 g/dL    A-G Ratio 0.9 0.8 - 1.7     METABOLIC PANEL, COMPREHENSIVE    Collection Time: 04/27/18  1:10 AM   Result Value Ref Range    Sodium 141 136 - 145 mmol/L    Potassium 3.5 3.5 - 5.5 mmol/L Chloride 105 100 - 108 mmol/L    CO2 29 21 - 32 mmol/L    Anion gap 7 3.0 - 18 mmol/L    Glucose 166 (H) 74 - 99 mg/dL    BUN 8 7.0 - 18 MG/DL    Creatinine 0.94 0.6 - 1.3 MG/DL    BUN/Creatinine ratio 9 (L) 12 - 20      GFR est AA >60 >60 ml/min/1.73m2    GFR est non-AA 59 (L) >60 ml/min/1.73m2    Calcium 9.2 8.5 - 10.1 MG/DL    Bilirubin, total 0.6 0.2 - 1.0 MG/DL    ALT (SGPT) 39 13 - 56 U/L    AST (SGOT) 27 15 - 37 U/L    Alk.  phosphatase 145 (H) 45 - 117 U/L    Protein, total 8.4 (H) 6.4 - 8.2 g/dL    Albumin 4.0 3.4 - 5.0 g/dL    Globulin 4.4 (H) 2.0 - 4.0 g/dL    A-G Ratio 0.9 0.8 - 1.7     LIPID PANEL    Collection Time: 04/27/18  1:10 AM   Result Value Ref Range    LIPID PROFILE          Cholesterol, total 245 (H) <200 MG/DL    Triglyceride 138 <150 MG/DL    HDL Cholesterol 48 40 - 60 MG/DL    LDL, calculated 169.4 (H) 0 - 100 MG/DL    VLDL, calculated 27.6 MG/DL    CHOL/HDL Ratio 5.1 (H) 0 - 5.0     CBC W/O DIFF    Collection Time: 04/27/18  1:10 AM   Result Value Ref Range    WBC 8.4 4.6 - 13.2 K/uL    RBC 5.77 (H) 4.20 - 5.30 M/uL    HGB 15.3 12.0 - 16.0 g/dL    HCT 45.8 (H) 35.0 - 45.0 %    MCV 79.4 74.0 - 97.0 FL    MCH 26.5 24.0 - 34.0 PG    MCHC 33.4 31.0 - 37.0 g/dL    RDW 14.0 11.6 - 14.5 %    PLATELET 021 374 - 456 K/uL    MPV 9.9 9.2 - 11.8 FL   PTT    Collection Time: 04/27/18  1:10 AM   Result Value Ref Range    aPTT 30.2 23.0 - 36.4 SEC       Imaging Reviewed:  None           Ángel Caro MD  4/27/2018, 9:18 PM

## 2018-04-27 NOTE — ROUTINE PROCESS
Bedside shift change report given to [address] (oncoming nurse) by Natanael Cuevas RN (offgoing nurse). Report included the following information SBAR, ED Summary, OR Summary, Procedure Summary, Intake/Output, MAR, Accordion, Recent Results, Med Rec Status, Cardiac Rhythm NSR  and Alarm Parameters .

## 2018-04-27 NOTE — ED NOTES
TRANSFER - OUT REPORT:    Verbal report given to YADI Cisneros (name) on Ema Vides  being transferred to CVT Stepdown (unit) for routine progression of care       Report consisted of patients Situation, Background, Assessment and   Recommendations(SBAR). Information from the following report(s) SBAR, ED Summary, [facility] and Recent Results was reviewed with the receiving nurse. Lines:   Peripheral IV 04/26/18 Left Antecubital (Active)   Site Assessment Clean, dry, & intact 4/26/2018  9:30 PM   Phlebitis Assessment 0 4/26/2018  9:30 PM   Infiltration Assessment 0 4/26/2018  9:30 PM   Dressing Status Clean, dry, & intact 4/26/2018  9:30 PM   Dressing Type Transparent 4/26/2018  9:30 PM   Hub Color/Line Status Patent; Flushed 4/26/2018  9:30 PM   Action Taken Blood drawn 4/26/2018  9:30 PM        Opportunity for questions and clarification was provided.       Patient transported with:   [facility]

## 2018-04-27 NOTE — ROUTINE PROCESS
Bedside and Verbal shift change report given to YADI Collier  (oncoming nurse) by Jerad Chávez RN  (offgoing nurse).  Report included the following information SBAR, Kardex, Procedure Summary, Intake/Output, MAR, Recent Results, Med Rec Status and Cardiac Rhythm SR.

## 2018-04-28 LAB
ATRIAL RATE: 80 BPM
CALCULATED P AXIS, ECG09: 51 DEGREES
CALCULATED R AXIS, ECG10: 0 DEGREES
CALCULATED T AXIS, ECG11: 45 DEGREES
DIAGNOSIS, 93000: NORMAL
P-R INTERVAL, ECG05: 152 MS
Q-T INTERVAL, ECG07: 430 MS
QRS DURATION, ECG06: 86 MS
QTC CALCULATION (BEZET), ECG08: 495 MS
VENTRICULAR RATE, ECG03: 80 BPM

## 2018-04-28 PROCEDURE — 74011250637 HC RX REV CODE- 250/637: Performed by: HOSPITALIST

## 2018-04-28 PROCEDURE — 65660000004 HC RM CVT STEPDOWN

## 2018-04-28 RX ORDER — MINOXIDIL 2.5 MG/1
2.5 TABLET ORAL DAILY
Status: DISCONTINUED | OUTPATIENT
Start: 2018-04-28 | End: 2018-04-30 | Stop reason: HOSPADM

## 2018-04-28 RX ADMIN — ACETAMINOPHEN 650 MG: 325 TABLET ORAL at 21:00

## 2018-04-28 RX ADMIN — Medication 10 ML: at 05:14

## 2018-04-28 RX ADMIN — SPIRONOLACTONE 25 MG: 25 TABLET, FILM COATED ORAL at 09:31

## 2018-04-28 RX ADMIN — FAMOTIDINE 20 MG: 20 TABLET ORAL at 17:08

## 2018-04-28 RX ADMIN — Medication 10 ML: at 14:00

## 2018-04-28 RX ADMIN — ASPIRIN 81 MG 81 MG: 81 TABLET ORAL at 09:31

## 2018-04-28 RX ADMIN — HYDRALAZINE HYDROCHLORIDE 100 MG: 50 TABLET, FILM COATED ORAL at 17:08

## 2018-04-28 RX ADMIN — LOSARTAN POTASSIUM 100 MG: 50 TABLET, FILM COATED ORAL at 09:31

## 2018-04-28 RX ADMIN — MINOXIDIL 2.5 MG: 2.5 TABLET ORAL at 14:06

## 2018-04-28 RX ADMIN — Medication 10 ML: at 21:03

## 2018-04-28 RX ADMIN — HYDRALAZINE HYDROCHLORIDE 100 MG: 50 TABLET, FILM COATED ORAL at 09:30

## 2018-04-28 RX ADMIN — HYDRALAZINE HYDROCHLORIDE 100 MG: 50 TABLET, FILM COATED ORAL at 21:00

## 2018-04-28 RX ADMIN — AMLODIPINE BESYLATE 10 MG: 10 TABLET ORAL at 09:31

## 2018-04-28 RX ADMIN — FAMOTIDINE 20 MG: 20 TABLET ORAL at 09:31

## 2018-04-28 RX ADMIN — ATORVASTATIN CALCIUM 40 MG: 40 TABLET, FILM COATED ORAL at 21:00

## 2018-04-28 RX ADMIN — ALUMINUM HYDROXIDE AND MAGNESIUM HYDROXIDE 15 ML: 200; 200 SUSPENSION ORAL at 22:19

## 2018-04-28 NOTE — PROGRESS NOTES
[facility]      CARDIOLOGY PROGRESS NOTE  RECS:  1. Hypertensive urgency- BP improving on current meds. Will adjust meds as needed. Continue Hydralazine, norvasc, losartan, aldactone  2. Chest pain - mild ST segment depression in inferolateral leads    1. Trop T - < 0.02 x 3   2. Continue aspirin, statin, unable to tolerate BB due to bradycardia   3. Plan for stress test on Monday  3. Dyslipidemia   1.    2. Atorvastatin 40 mg    Will f/u    ASSESSMENT:  Hospital Problems  Never Reviewed          Codes Class Noted POA    HTN (hypertension), malignant ICD-10-CM: I10  ICD-9-CM: 401.0  4/26/2018 Unknown        Hypertensive urgency ICD-10-CM: I16.0  ICD-9-CM: 401.9  4/26/2018 Unknown                SUBJECTIVE:  No CP or SOB    OBJECTIVE:    VS:   Visit Vitals    /79    Pulse 81    Temp 98.3 °F (36.8 °C)    Resp 18    Ht 5' 6\" (1.676 m)    Wt 106.9 kg (235 lb 11.2 oz)    SpO2 97%    BMI 38.04 kg/m2         Intake/Output Summary (Last 24 hours) at 04/28/18 1118  Last data filed at 04/28/18 1024   Gross per 24 hour   Intake              340 ml   Output              400 ml   Net              -60 ml     TELE: normal sinus rhythm    General: alert and in no apparent distress  HENT: Normocephalic, atraumatic. Normal external eye.   Neck :  JVD difficult to assess due to obesity  Cardiac:  regular rate and rhythm, systolic murmur: holosystolic 2/6, blowing at 2nd right intercostal space  Lungs: clear to auscultation bilaterally  Abdomen: Soft, nontender, no masses  Extremities:  No c/c/e, peripheral pulses present      Labs: Results:       Chemistry Recent Labs      04/27/18   0110  04/26/18   1455   GLU  166*  112*   NA  141  141   K  3.5  4.4   CL  105  105   CO2  29  30   BUN  8  10   CREA  0.94  0.83   CA  9.2  9.0   AGAP  7  6   BUCR  9*  12   AP  145*  138*   TP  8.4*  7.8   ALB  4.0  3.7   GLOB  4.4*  4.1*   AGRAT  0.9  0.9      CBC w/Diff Recent Labs 04/27/18   0110  04/26/18   1455   WBC  8.4  6.2   RBC  5.77*  5.44*   HGB  15.3  14.4   HCT  45.8*  43.0   PLT  283  243   GRANS   --   58   LYMPH   --   31   EOS   --   4      Cardiac Enzymes Recent Labs      04/27/18 2015 04/27/18   1151   CPK  180  194*   CKND1  1.1  0.9      Coagulation Recent Labs      04/27/18   0110   APTT  30.2       Lipid Panel Lab Results   Component Value Date/Time    Cholesterol, total 245 (H) 04/27/2018 01:10 AM    HDL Cholesterol 48 04/27/2018 01:10 AM    LDL, calculated 169.4 (H) 04/27/2018 01:10 AM    VLDL, calculated 27.6 04/27/2018 01:10 AM    Triglyceride 138 04/27/2018 01:10 AM    CHOL/HDL Ratio 5.1 (H) 04/27/2018 01:10 AM      BNP No results for input(s): BNPP in the last 72 hours. Liver Enzymes Recent Labs      04/27/18   0110   TP  8.4*   ALB  4.0   AP  145*   SGOT  27      Thyroid Studies Lab Results   Component Value Date/Time    TSH 1.14 04/26/2018 02:55 PM              Yaya Olsen NP   Pager # [phone]      I have independently evaluated taken history and examined the patient. All relevant labs and testing data's are reviewed. Care plan discussed and updated after review.   Aleida Russo MD

## 2018-04-28 NOTE — PROGRESS NOTES
[facility] Hospitalist Group  Progress Note    Patient: Lambert Gonsalez Age: 75 y.o. : 1950 MR#: [id number] SSN: [id number]  Date/Time: 2018     Subjective: pt feels lot better, no CP or lightheadedness. No SOB. No weakness or numbness. Assessment/Plan:   1. Hypertensive Urgency: BP improving but still in 160's, will cont ABR, Norvasc, aldactone and hydralazine. Will add minoxidil low dose. renal duplex pending. Echo noted. 2. Chest discomfort with EKG changes: CE neg, cont asa, statin. cardio in put noted. Plan for stress test on Monday   3. Dyslipidemia: continue Zocor     D/w pt at bedside     Case discussed with:  [x]Patient  []Family  [x]Nursing  []Case Management  DVT Prophylaxis:  []Lovenox  [x]Hep SQ  []SCDs  []Coumadin   []On Heparin gtt    Objective:   VS:   Visit Vitals    /78 (BP 1 Location: Left arm, BP Patient Position: At rest)    Pulse 90    Temp 98.2 °F (36.8 °C)    Resp 20    Ht 5' 6\" (1.676 m)    Wt 106.9 kg (235 lb 11.2 oz)    SpO2 96%    BMI 38.04 kg/m2      Tmax/24hrs: Temp (24hrs), Av.3 °F (36.8 °C), Min:97.9 °F (36.6 °C), Max:98.7 °F (37.1 °C)  IOBRIEF    Intake/Output Summary (Last 24 hours) at 18 1340  Last data filed at 18 1024   Gross per 24 hour   Intake              100 ml   Output              400 ml   Net             -300 ml       General:  Alert, cooperative, no acute distress    Pulmonary:  CTA Bilaterally. Cardiovascular: Regular rate and Rhythm. GI:  Soft, Non distended, Non tender. + Bowel sounds. Extremities:  No edema, cyanosis, clubbing. Psych: Good insight. Not anxious or agitated. Neurologic: Alert and oriented X 3. No acute neuro deficits.       Medications:   Current Facility-Administered Medications   Medication Dose Route Frequency    minoxidil (LONITEN) tablet 2.5 mg  2.5 mg Oral DAILY    amLODIPine (NORVASC) tablet 10 mg  10 mg Oral DAILY    hydrALAZINE (APRESOLINE) tablet 100 mg  100 mg Oral TID    spironolactone (ALDACTONE) tablet 25 mg  25 mg Oral DAILY    famotidine (PEPCID) tablet 20 mg  20 mg Oral BID    aluminum-magnesium hydroxide (MAALOX) oral suspension 15 mL  15 mL Oral QID PRN    aspirin chewable tablet 81 mg  81 mg Oral DAILY    nitroglycerin (NITROBID) 2 % ointment 1 Inch  1 Inch Topical Q8H PRN    atorvastatin (LIPITOR) tablet 40 mg  40 mg Oral QHS    acetaminophen (TYLENOL) tablet 650 mg  650 mg Oral Q6H PRN    ondansetron (ZOFRAN) injection 4 mg  4 mg IntraVENous Q6H PRN    sodium chloride (NS) flush 5-10 mL  5-10 mL IntraVENous Q8H    sodium chloride (NS) flush 5-10 mL  5-10 mL IntraVENous PRN    heparin (porcine) injection 5,000 Units  5,000 Units SubCUTAneous Q8H    losartan (COZAAR) tablet 100 mg  100 mg Oral DAILY    hydrALAZINE (APRESOLINE) 20 mg/mL injection 10 mg  10 mg IntraVENous Q6H PRN       Labs:    Recent Results (from the past 24 hour(s))   EKG, 12 LEAD, SUBSEQUENT    Collection Time: 04/27/18  4:48 PM   Result Value Ref Range    Ventricular Rate 78 BPM    Atrial Rate 78 BPM    P-R Interval 154 ms    QRS Duration 86 ms    Q-T Interval 436 ms    QTC Calculation (Bezet) 497 ms    Calculated P Axis 37 degrees    Calculated R Axis -3 degrees    Calculated T Axis 45 degrees    Diagnosis       Normal sinus rhythm  Nonspecific ST and T wave abnormality  Prolonged QT  Abnormal ECG  When compared with ECG of 26-APR-2018 12:35,  Vent.  rate has increased BY  30 BPM  ST now depressed in Lateral leads  T wave inversion less evident in Lateral leads  QT has lengthened  Confirmed by Donna Moon ([id number]) on 4/27/2018 5:47:59 PM     EKG, 12 LEAD, INITIAL    Collection Time: 04/27/18  6:22 PM   Result Value Ref Range    Ventricular Rate 80 BPM    Atrial Rate 80 BPM    P-R Interval 152 ms    QRS Duration 86 ms    Q-T Interval 430 ms    QTC Calculation (Bezet) 495 ms    Calculated P Axis 51 degrees    Calculated R Axis 0 degrees    Calculated T Axis 45 degrees Diagnosis       Normal sinus rhythm  Nonspecific ST and T wave abnormality  Prolonged QT  Abnormal ECG  When compared with ECG of 27-APR-2018 16:48,  No significant change was found  Confirmed by Asa Coad [id number]) on 4/28/2018 10:24:21 AM     CARDIAC PANEL,(CK, CKMB & TROPONIN)    Collection Time: 04/27/18  8:15 PM   Result Value Ref Range     26 - 192 U/L    CK - MB 2.0 <3.6 ng/ml    CK-MB Index 1.1 0.0 - 4.0 %    Troponin-I, Qt. 0.02 0.0 - 0.045 NG/ML       Signed By: Janki Morejon MD     April 28, 2018

## 2018-04-28 NOTE — PROGRESS NOTES
Problem: Hypertension  Goal: *Blood pressure within specified parameters  Outcome: Progressing Towards Goal  Oral meds given. /71, HR 85, Temp 97.7, O2 96% on RA, Resp 18. Will continue to monitor.

## 2018-04-28 NOTE — PROGRESS NOTES
Problem: Falls - Risk of  Goal: *Absence of Falls  Document Placido Fall Risk and appropriate interventions in the flowsheet. Outcome: Progressing Towards Goal  Fall Risk Interventions:   Fall risk sign on patient,s door.   Educate patient to call before getting up from bed or chair  Patient room clean  Patient have gripper socks on  Call light in reach  Bed in low postion         Medication Interventions: Teach patient to arise slowly         History of Falls Interventions: Door open when patient unattended

## 2018-04-28 NOTE — ROUTINE PROCESS
Bedside shift change report given to [address] [place] [address] (oncoming nurse) by Josephine Olson RN (offgoing nurse). Report included the following information SBAR, Kardex, Procedure Summary, Intake/Output, MAR, Accordion, Recent Results, Med Rec Status and Cardiac Rhythm NSR.

## 2018-04-29 PROCEDURE — 74011250637 HC RX REV CODE- 250/637: Performed by: HOSPITALIST

## 2018-04-29 PROCEDURE — 77030027138 HC INCENT SPIROMETER -A

## 2018-04-29 PROCEDURE — 65660000000 HC RM CCU STEPDOWN

## 2018-04-29 RX ADMIN — SPIRONOLACTONE 25 MG: 25 TABLET, FILM COATED ORAL at 08:43

## 2018-04-29 RX ADMIN — ATORVASTATIN CALCIUM 40 MG: 40 TABLET, FILM COATED ORAL at 21:44

## 2018-04-29 RX ADMIN — AMLODIPINE BESYLATE 10 MG: 10 TABLET ORAL at 08:44

## 2018-04-29 RX ADMIN — HYDRALAZINE HYDROCHLORIDE 100 MG: 50 TABLET, FILM COATED ORAL at 15:14

## 2018-04-29 RX ADMIN — LOSARTAN POTASSIUM 100 MG: 50 TABLET, FILM COATED ORAL at 08:43

## 2018-04-29 RX ADMIN — FAMOTIDINE 20 MG: 20 TABLET ORAL at 17:43

## 2018-04-29 RX ADMIN — ALUMINUM HYDROXIDE AND MAGNESIUM HYDROXIDE 15 ML: 200; 200 SUSPENSION ORAL at 17:45

## 2018-04-29 RX ADMIN — Medication 10 ML: at 21:44

## 2018-04-29 RX ADMIN — MINOXIDIL 2.5 MG: 2.5 TABLET ORAL at 08:43

## 2018-04-29 RX ADMIN — FAMOTIDINE 20 MG: 20 TABLET ORAL at 08:44

## 2018-04-29 RX ADMIN — Medication 10 ML: at 15:15

## 2018-04-29 RX ADMIN — HYDRALAZINE HYDROCHLORIDE 100 MG: 50 TABLET, FILM COATED ORAL at 08:44

## 2018-04-29 RX ADMIN — Medication 10 ML: at 07:50

## 2018-04-29 RX ADMIN — ASPIRIN 81 MG 81 MG: 81 TABLET ORAL at 08:44

## 2018-04-29 RX ADMIN — ALUMINUM HYDROXIDE AND MAGNESIUM HYDROXIDE 15 ML: 200; 200 SUSPENSION ORAL at 08:58

## 2018-04-29 NOTE — PROGRESS NOTES
[facility] Hospitalist Group  Progress Note    Patient: Destinee Comment Age: 75 y.o. : 1950 MR#: [id number] SSN: [id number]  Date/Time: 2018     Subjective: pt feels lot better, no CP or lightheadedness. No SOB. C/o some indigestion symptoms. Assessment/Plan:   1. Hypertensive Urgency: BP improving but still in 160's, will cont minoxidil, ABR, Norvasc, aldactone and hydralazine. renal duplex pending. Echo noted. 2. Chest discomfort with EKG changes: CE neg, cont asa, statin. cardio in put noted. Plan for stress test on Monday   3. Dyslipidemia: continue Zocor   4. GERD: cont Pepcid and maalox    D/w pt and family at bedside   Home in am if ok cardio, after stress test and renal A duplex    Case discussed with:  [x]Patient  []Family  [x]Nursing  []Case Management  DVT Prophylaxis:  []Lovenox  [x]Hep SQ  []SCDs  []Coumadin   []On Heparin gtt    Objective:   VS:   Visit Vitals    /81    Pulse 94    Temp 98.6 °F (37 °C)    Resp 20    Ht 5' 6\" (1.676 m)    Wt 106.9 kg (235 lb 11.2 oz)    SpO2 96%    BMI 38.04 kg/m2      Tmax/24hrs: Temp (24hrs), Av.2 °F (36.8 °C), Min:97.9 °F (36.6 °C), Max:98.6 °F (37 °C)  IOBRIEF    Intake/Output Summary (Last 24 hours) at 18 0904  Last data filed at 18 0847   Gross per 24 hour   Intake              520 ml   Output                0 ml   Net              520 ml       General:  Alert, cooperative, no acute distress    Pulmonary:  CTA Bilaterally. Cardiovascular: Regular rate and Rhythm. GI:  Soft, Non distended, Non tender. + Bowel sounds. Extremities:  No edema, cyanosis, clubbing. Psych: Good insight. Not anxious or agitated. Neurologic: Alert and oriented X 3. No acute neuro deficits.       Medications:   Current Facility-Administered Medications   Medication Dose Route Frequency    minoxidil (LONITEN) tablet 2.5 mg  2.5 mg Oral DAILY    amLODIPine (NORVASC) tablet 10 mg  10 mg Oral DAILY    hydrALAZINE (APRESOLINE) tablet 100 mg  100 mg Oral TID    spironolactone (ALDACTONE) tablet 25 mg  25 mg Oral DAILY    famotidine (PEPCID) tablet 20 mg  20 mg Oral BID    aluminum-magnesium hydroxide (MAALOX) oral suspension 15 mL  15 mL Oral QID PRN    aspirin chewable tablet 81 mg  81 mg Oral DAILY    nitroglycerin (NITROBID) 2 % ointment 1 Inch  1 Inch Topical Q8H PRN    atorvastatin (LIPITOR) tablet 40 mg  40 mg Oral QHS    acetaminophen (TYLENOL) tablet 650 mg  650 mg Oral Q6H PRN    ondansetron (ZOFRAN) injection 4 mg  4 mg IntraVENous Q6H PRN    sodium chloride (NS) flush 5-10 mL  5-10 mL IntraVENous Q8H    sodium chloride (NS) flush 5-10 mL  5-10 mL IntraVENous PRN    heparin (porcine) injection 5,000 Units  5,000 Units SubCUTAneous Q8H    losartan (COZAAR) tablet 100 mg  100 mg Oral DAILY    hydrALAZINE (APRESOLINE) 20 mg/mL injection 10 mg  10 mg IntraVENous Q6H PRN       Labs:    No results found for this or any previous visit (from the past 24 hour(s)).     Signed By: Leola Carlos MD     April 29, 2018

## 2018-04-29 NOTE — ROUTINE PROCESS
TRANSFER - OUT REPORT:    Verbal report given to [facility] RN(name) on [employer]  being transferred to (unit) for routine progression of care       Report consisted of patients Situation, Background, Assessment and   Recommendations(SBAR). Information from the following report(s) SBAR, MAR and Cardiac Rhythm NSR was reviewed with the receiving nurse. Lines:   Peripheral IV 04/26/18 Left Antecubital (Active)   Site Assessment Clean, dry, & intact 4/29/2018  8:00 AM   Phlebitis Assessment 0 4/29/2018  8:00 AM   Infiltration Assessment 0 4/29/2018  8:00 AM   Dressing Status Clean, dry, & intact 4/29/2018  8:00 AM   Dressing Type Transparent 4/29/2018  8:00 AM   Hub Color/Line Status Pink 4/29/2018  8:00 AM   Action Taken Blood drawn 4/26/2018  9:30 PM   Alcohol Cap Used Yes 4/29/2018  8:00 AM        Opportunity for questions and clarification was provided.       Patient transported with:   [facility]

## 2018-04-29 NOTE — PROGRESS NOTES
[facility]      CARDIOLOGY PROGRESS NOTE  RECS:  1. Hypertensive urgency- BP improving on current meds. Will adjust meds as needed. Continue Hydralazine, norvasc, losartan, aldactone  2. Chest pain - mild ST segment depression in inferolateral leads    1. Trop T - < 0.02 x 3   2. Continue aspirin, statin, unable to tolerate BB due to bradycardia   3. Plan for stress test on Monday  3. Dyslipidemia   1.    2. Atorvastatin 40 mg    Will f/u    ASSESSMENT:  Hospital Problems  Never Reviewed          Codes Class Noted POA    HTN (hypertension), malignant ICD-10-CM: I10  ICD-9-CM: 401.0  4/26/2018 Unknown        Hypertensive urgency ICD-10-CM: I16.0  ICD-9-CM: 401.9  4/26/2018 Unknown                SUBJECTIVE:  No CP or SOB    OBJECTIVE:    VS:   Visit Vitals    /81    Pulse 94    Temp 98.6 °F (37 °C)    Resp 20    Ht 5' 6\" (1.676 m)    Wt 106.9 kg (235 lb 11.2 oz)    SpO2 96%    BMI 38.04 kg/m2         Intake/Output Summary (Last 24 hours) at 04/29/18 1101  Last data filed at 04/29/18 0847   Gross per 24 hour   Intake              420 ml   Output                0 ml   Net              420 ml     TELE: normal sinus rhythm    General: alert and in no apparent distress  HENT: Normocephalic, atraumatic. Normal external eye.   Neck :  JVD difficult to assess due to obesity  Cardiac:  regular rate and rhythm, systolic murmur: holosystolic 2/6, blowing at 2nd right intercostal space  Lungs: clear to auscultation bilaterally  Abdomen: Soft, nontender, no masses  Extremities:  No c/c/e, peripheral pulses present      Labs: Results:       Chemistry Recent Labs      04/27/18   0110  04/26/18   1455   GLU  166*  112*   NA  141  141   K  3.5  4.4   CL  105  105   CO2  29  30   BUN  8  10   CREA  0.94  0.83   CA  9.2  9.0   AGAP  7  6   BUCR  9*  12   AP  145*  138*   TP  8.4*  7.8   ALB  4.0  3.7   GLOB  4.4*  4.1*   AGRAT  0.9  0.9      CBC w/Diff Recent Labs      04/27/18 0110  04/26/18   1455   WBC  8.4  6.2   RBC  5.77*  5.44*   HGB  15.3  14.4   HCT  45.8*  43.0   PLT  283  243   GRANS   --   58   LYMPH   --   31   EOS   --   4      Cardiac Enzymes Recent Labs      04/27/18 2015 04/27/18   1151   CPK  180  194*   CKND1  1.1  0.9      Coagulation Recent Labs      04/27/18   0110   APTT  30.2       Lipid Panel Lab Results   Component Value Date/Time    Cholesterol, total 245 (H) 04/27/2018 01:10 AM    HDL Cholesterol 48 04/27/2018 01:10 AM    LDL, calculated 169.4 (H) 04/27/2018 01:10 AM    VLDL, calculated 27.6 04/27/2018 01:10 AM    Triglyceride 138 04/27/2018 01:10 AM    CHOL/HDL Ratio 5.1 (H) 04/27/2018 01:10 AM      BNP No results for input(s): BNPP in the last 72 hours.    Liver Enzymes Recent Labs      04/27/18   0110   TP  8.4*   ALB  4.0   AP  145*   SGOT  27      Thyroid Studies Lab Results   Component Value Date/Time    TSH 1.14 04/26/2018 02:55 PM              Bri Mendenhall MD

## 2018-04-29 NOTE — PROGRESS NOTES
Bedside and Verbal shift change report given to [address] (oncoming nurse) by Bushra Saldivar RN (offgoing nurse). Report included the following information SBAR and Recent Results.

## 2018-04-30 VITALS
TEMPERATURE: 98.4 F | HEART RATE: 86 BPM | RESPIRATION RATE: 18 BRPM | BODY MASS INDEX: 37.16 KG/M2 | SYSTOLIC BLOOD PRESSURE: 145 MMHG | OXYGEN SATURATION: 99 % | WEIGHT: 231.2 LBS | DIASTOLIC BLOOD PRESSURE: 84 MMHG | HEIGHT: 66 IN

## 2018-04-30 LAB
ANION GAP SERPL CALC-SCNC: 5 MMOL/L (ref 3–18)
BASOPHILS # BLD: 0 K/UL (ref 0–0.06)
BASOPHILS NFR BLD: 0 % (ref 0–2)
BUN SERPL-MCNC: 16 MG/DL (ref 7–18)
BUN/CREAT SERPL: 12 (ref 12–20)
CALCIUM SERPL-MCNC: 9 MG/DL (ref 8.5–10.1)
CHLORIDE SERPL-SCNC: 105 MMOL/L (ref 100–108)
CO2 SERPL-SCNC: 30 MMOL/L (ref 21–32)
CREAT SERPL-MCNC: 1.33 MG/DL (ref 0.6–1.3)
DIFFERENTIAL METHOD BLD: ABNORMAL
EOSINOPHIL # BLD: 0.1 K/UL (ref 0–0.4)
EOSINOPHIL NFR BLD: 1 % (ref 0–5)
ERYTHROCYTE [DISTWIDTH] IN BLOOD BY AUTOMATED COUNT: 14.7 % (ref 11.6–14.5)
GLUCOSE SERPL-MCNC: 141 MG/DL (ref 74–99)
HCT VFR BLD AUTO: 40.4 % (ref 35–45)
HGB BLD-MCNC: 13.5 G/DL (ref 12–16)
LYMPHOCYTES # BLD: 2.1 K/UL (ref 0.9–3.6)
LYMPHOCYTES NFR BLD: 27 % (ref 21–52)
MCH RBC QN AUTO: 26.5 PG (ref 24–34)
MCHC RBC AUTO-ENTMCNC: 33.4 G/DL (ref 31–37)
MCV RBC AUTO: 79.4 FL (ref 74–97)
MONOCYTES # BLD: 0.7 K/UL (ref 0.05–1.2)
MONOCYTES NFR BLD: 9 % (ref 3–10)
NEUTS SEG # BLD: 4.9 K/UL (ref 1.8–8)
NEUTS SEG NFR BLD: 63 % (ref 40–73)
PLATELET # BLD AUTO: 275 K/UL (ref 135–420)
PMV BLD AUTO: 9.4 FL (ref 9.2–11.8)
POTASSIUM SERPL-SCNC: 3.7 MMOL/L (ref 3.5–5.5)
RBC # BLD AUTO: 5.09 M/UL (ref 4.2–5.3)
SODIUM SERPL-SCNC: 140 MMOL/L (ref 136–145)
WBC # BLD AUTO: 7.9 K/UL (ref 4.6–13.2)

## 2018-04-30 PROCEDURE — 78452 HT MUSCLE IMAGE SPECT MULT: CPT | Performed by: INTERNAL MEDICINE

## 2018-04-30 PROCEDURE — 93975 VASCULAR STUDY: CPT

## 2018-04-30 PROCEDURE — 93017 CV STRESS TEST TRACING ONLY: CPT | Performed by: INTERNAL MEDICINE

## 2018-04-30 PROCEDURE — 74011250637 HC RX REV CODE- 250/637: Performed by: HOSPITALIST

## 2018-04-30 PROCEDURE — 85025 COMPLETE CBC W/AUTO DIFF WBC: CPT | Performed by: HOSPITALIST

## 2018-04-30 PROCEDURE — A9500 TC99M SESTAMIBI: HCPCS

## 2018-04-30 PROCEDURE — 36415 COLL VENOUS BLD VENIPUNCTURE: CPT | Performed by: HOSPITALIST

## 2018-04-30 PROCEDURE — 74011250636 HC RX REV CODE- 250/636: Performed by: INTERNAL MEDICINE

## 2018-04-30 PROCEDURE — 80048 BASIC METABOLIC PNL TOTAL CA: CPT | Performed by: HOSPITALIST

## 2018-04-30 RX ORDER — HYDRALAZINE HYDROCHLORIDE 100 MG/1
100 TABLET, FILM COATED ORAL 3 TIMES DAILY
Qty: 90 TAB | Refills: 1 | Status: SHIPPED | OUTPATIENT
Start: 2018-04-30

## 2018-04-30 RX ORDER — NITROGLYCERIN 0.4 MG/1
0.4 TABLET SUBLINGUAL
Qty: 1 BOTTLE | Refills: 1 | Status: SHIPPED | OUTPATIENT
Start: 2018-04-30

## 2018-04-30 RX ORDER — LOSARTAN POTASSIUM 100 MG/1
100 TABLET ORAL DAILY
Qty: 30 TAB | Refills: 1 | Status: SHIPPED | OUTPATIENT
Start: 2018-05-01

## 2018-04-30 RX ORDER — FAMOTIDINE 20 MG/1
20 TABLET, FILM COATED ORAL 2 TIMES DAILY
Qty: 60 TAB | Refills: 1 | Status: SHIPPED | OUTPATIENT
Start: 2018-04-30

## 2018-04-30 RX ORDER — SPIRONOLACTONE 25 MG/1
25 TABLET ORAL DAILY
Qty: 30 TAB | Refills: 1 | Status: SHIPPED | OUTPATIENT
Start: 2018-05-01

## 2018-04-30 RX ORDER — MINOXIDIL 2.5 MG/1
2.5 TABLET ORAL DAILY
Qty: 30 TAB | Refills: 1 | Status: SHIPPED | OUTPATIENT
Start: 2018-05-01

## 2018-04-30 RX ORDER — GUAIFENESIN 100 MG/5ML
81 LIQUID (ML) ORAL DAILY
Qty: 30 TAB | Refills: 1 | Status: SHIPPED | OUTPATIENT
Start: 2018-05-01

## 2018-04-30 RX ORDER — ATORVASTATIN CALCIUM 40 MG/1
40 TABLET, FILM COATED ORAL
Qty: 30 TAB | Refills: 1 | Status: SHIPPED | OUTPATIENT
Start: 2018-04-30

## 2018-04-30 RX ORDER — AMLODIPINE BESYLATE 10 MG/1
10 TABLET ORAL DAILY
Qty: 30 TAB | Refills: 1 | Status: SHIPPED | OUTPATIENT
Start: 2018-05-01

## 2018-04-30 RX ADMIN — Medication 10 ML: at 05:14

## 2018-04-30 RX ADMIN — ASPIRIN 81 MG 81 MG: 81 TABLET ORAL at 10:12

## 2018-04-30 RX ADMIN — MINOXIDIL 2.5 MG: 2.5 TABLET ORAL at 10:12

## 2018-04-30 RX ADMIN — FAMOTIDINE 20 MG: 20 TABLET ORAL at 10:12

## 2018-04-30 RX ADMIN — LOSARTAN POTASSIUM 100 MG: 50 TABLET, FILM COATED ORAL at 10:12

## 2018-04-30 RX ADMIN — AMLODIPINE BESYLATE 10 MG: 10 TABLET ORAL at 10:12

## 2018-04-30 RX ADMIN — REGADENOSON 0.4 MG: 0.08 INJECTION, SOLUTION INTRAVENOUS at 08:40

## 2018-04-30 RX ADMIN — SPIRONOLACTONE 25 MG: 25 TABLET, FILM COATED ORAL at 10:12

## 2018-04-30 NOTE — PROCEDURES
[facility] ***    Name: Laine Mooney  MRN: [id number]    Inpatient  : 1950  HIS Order #: [id number]  [address] [facility] Visit #: [id number]  Date: 2018    TYPE OF TEST: Visceral Arterial Duplex    REASON FOR TEST  HTN, High suspicion renal vascular and diminished renal function    Aortic PSV:  97.0 cm/s  Diameter AP:     cm   TV:     cm                   Right          Left  Renal Artery:- -------------  -------------  Proximal  PSV: 170.0          151.0  Mid       PSV: 159.0          145.0  Distal    PSV: 126.0           78.0  Aortic ratio :   1.8            1.6    Medullary PSV:  54.0           34.0            EDV:   9.0            6.0            EDR:   0.2            0.2            SDR:   6.0            5.7    Cortical  PSV:  37.0           34.0            EDV:   7.0            5.0            EDR:   0.2            0.1            SDR:   5.3            6.8  Stenosis:      Normal         Normal  Kidney size:   12.4 cm        12.4 cm               x  5.0 cm      x  5.1 cm    Hilar:-        Right          Left  Acc. Time  AT:     secs           secs  Acc. Index AI:             RI: 0.83           0.83    INTERPRETATION/FINDINGS  Duplex images were obtained using 2-D gray scale, color flow, and  spectral Doppler analysis. RENAL:  1. No significant renal artery stenosis identified, both renal  arteries visualized. 2. Patent bilateral renal veins without evidence of renal vein  thrombosis. 3. The right kidney measures 12.4 cm. 4. The left kidney measures 12.4 cm.  5. Bilateral intrinsic/medical renal disease identified. ADDITIONAL COMMENTS  Aorta not well visualized due to overlying bowel gases and patient's  overall body habitus. Technically difficult exam due to overlying bowel gases and patient's  overall body habitus. I have personally reviewed the data relevant to the interpretation of  this  study. TECHNOLOGIST: Viv Zuleta.  Beto RVT  Signed: 2018 10:14 AM    PHYSICIAN: Abbe Reyes MD  Signed: 05/01/2018 08:31 AM

## 2018-04-30 NOTE — PROCEDURES
[address] Rice  MR#: [id number]  : 1950  ACCOUNT #: [de-identified]   DATE OF SERVICE: 2018    ORDERING PHYSICIAN:   Dr. Henna Bhakta. INTERPRETING PHYSICIAN:  Dr. Stephanie Powell. INDICATION:  Chest pain. IV SITE:  Left antecubital vein. BASELINE DATA:  Baseline EKG revealed normal sinus rhythm with ST segment depression in the inferolateral leads. Following this, 0.4 mg of IV Lexiscan was injected over a 30-second period. Heart rate increased to a maximum of 111 beats per minute. Maximum blood pressure was 140/66 mmHg. The patient did not report chest pain or trouble breathing. No ventricular arrhythmias were noted. The patient had borderline ST segment depressions inferolateral and anterior leads. Test was stopped due to completion of protocol. INTERPRETATION:  1. Mild worsening of ST segment depression compared to baseline. However, equivocal testing due to resting abnormal ECG. 2.  Nuclear imaging report to follow. NUCLEAR IMAGING REPORT:  Following IV Lexiscan, 33 mCi of sestamibi was injected. Stress images were obtained. Rest images were obtained using 10.91 mCi of sestamibi. Images were compared. FINDINGS:  1. Post stress imaging in short axis, horizontal, vertical long axis reveals normal isotope redistribution in all myocardial segments. 2.  Resting images show normal isotope uptake in all areas. 3.  Left ventricular ejection fraction is 65% with normal cavity size and wall motion. INTERPRETATION:  1. No evidence of ischemia or infarction on the SPECT imaging. Borderline ST segment depression; however, not confirmatory for ischemia due to resting abnormal ECG. 2.  Normal cavity size with normal wall motion and ejection fraction. 3. Low risk scan. 4.  Clinical correlation is recommended.       MD Lee Ann Trevizo  D: 2018 13:03     T: 2018 15:37  JOB #: [id number]

## 2018-04-30 NOTE — ROUTINE PROCESS
1956 Assumed care of patient from off going nurse. Patient sitting in recliner chair. No distress noted. Call bell within reach, recliner chair wheels locked and patient instructed to use call bell for assistance. Will continue to monitor. 0710 Bedside and Verbal shift change report given to Reynold Khanna RN (oncoming nurse) by Cayla Mccoy RN(offgoing nurse). Report included the following information Kardex, Intake/Output, MAR, Recent Results and Cardiac Rhythm NSR/ST with PVCs.

## 2018-04-30 NOTE — DISCHARGE INSTRUCTIONS
DISCHARGE SUMMARY from Nurse    PATIENT INSTRUCTIONS:    After general anesthesia or intravenous sedation, for 24 hours or while taking prescription Narcotics:  · Limit your activities  · Do not drive and operate hazardous machinery  · Do not make important personal or business decisions  · Do  not drink alcoholic beverages  · If you have not urinated within 8 hours after discharge, please contact your surgeon on call. Report the following to your surgeon:  · Excessive pain, swelling, redness or odor of or around the surgical area  · Temperature over 100.5  · Nausea and vomiting lasting longer than 4 hours or if unable to take medications  · Any signs of decreased circulation or nerve impairment to extremity: change in color, persistent  numbness, tingling, coldness or increase pain  · Any questions    What to do at Home:  Recommended activity: Activity as tolerated    If you experience any of the following symptoms chest pain, shortness of breath please follow up in ER    *  Please give a list of your current medications to your Primary Care Provider. *  Please update this list whenever your medications are discontinued, doses are      changed, or new medications (including over-the-counter products) are added. *  Please carry medication information at all times in case of emergency situations. These are general instructions for a healthy lifestyle:    No smoking/ No tobacco products/ Avoid exposure to second hand smoke  Surgeon General's Warning:  Quitting smoking now greatly reduces serious risk to your health.     Obesity, smoking, and sedentary lifestyle greatly increases your risk for illness    A healthy diet, regular physical exercise & weight monitoring are important for maintaining a healthy lifestyle    You may be retaining fluid if you have a history of heart failure or if you experience any of the following symptoms:  Weight gain of 3 pounds or more overnight or 5 pounds in a week, increased swelling in our hands or feet or shortness of breath while lying flat in bed. Please call your doctor as soon as you notice any of these symptoms; do not wait until your next office visit. Recognize signs and symptoms of STROKE:    F-face looks uneven    A-arms unable to move or move unevenly    S-speech slurred or non-existent    T-time-call 911 as soon as signs and symptoms begin-DO NOT go       Back to bed or wait to see if you get better-TIME IS BRAIN. Warning Signs of HEART ATTACK     Call 911 if you have these symptoms:   Chest discomfort. Most heart attacks involve discomfort in the center of the chest that lasts more than a few minutes, or that goes away and comes back. It can feel like uncomfortable pressure, squeezing, fullness, or pain.  Discomfort in other areas of the upper body. Symptoms can include pain or discomfort in one or both arms, the back, neck, jaw, or stomach.  Shortness of breath with or without chest discomfort.  Other signs may include breaking out in a cold sweat, nausea, or lightheadedness. Don't wait more than five minutes to call 911 - MINUTES MATTER! Fast action can save your life. Calling 911 is almost always the fastest way to get lifesaving treatment. Emergency Medical Services staff can begin treatment when they arrive -- up to an hour sooner than if someone gets to the hospital by car. The discharge information has been reviewed with the patient. The patient verbalized understanding. Discharge medications reviewed with the patient       High Blood Pressure: Care Instructions  Your Care Instructions    If your blood pressure is usually above 140/90, you have high blood pressure, or hypertension. That means the top number is 140 or higher or the bottom number is 90 or higher, or both. Despite what a lot of people think, high blood pressure usually doesn't cause headaches or make you feel dizzy or lightheaded. It usually has no symptoms.  But it does increase your risk for heart attack, stroke, and kidney or eye damage. The higher your blood pressure, the more your risk increases. Your doctor will give you a goal for your blood pressure. Your goal will be based on your health and your age. An example of a goal is to keep your blood pressure below 140/90. Lifestyle changes, such as eating healthy and being active, are always important to help lower blood pressure. You might also take medicine to reach your blood pressure goal.  Follow-up care is a key part of your treatment and safety. Be sure to make and go to all appointments, and call your doctor if you are having problems. It's also a good idea to know your test results and keep a list of the medicines you take. How can you care for yourself at home? Medical treatment  · If you stop taking your medicine, your blood pressure will go back up. You may take one or more types of medicine to lower your blood pressure. Be safe with medicines. Take your medicine exactly as prescribed. Call your doctor if you think you are having a problem with your medicine. · Talk to your doctor before you start taking aspirin every day. Aspirin can help certain people lower their risk of a heart attack or stroke. But taking aspirin isn't right for everyone, because it can cause serious bleeding. · See your doctor regularly. You may need to see the doctor more often at first or until your blood pressure comes down. · If you are taking blood pressure medicine, talk to your doctor before you take decongestants or anti-inflammatory medicine, such as ibuprofen. Some of these medicines can raise blood pressure. · Learn how to check your blood pressure at home. Lifestyle changes  · Stay at a healthy weight. This is especially important if you put on weight around the waist. Losing even 10 pounds can help you lower your blood pressure. · If your doctor recommends it, get more exercise. Walking is a good choice.  Bit by bit, increase the amount you walk every day. Try for at least 30 minutes on most days of the week. You also may want to swim, bike, or do other activities. · Avoid or limit alcohol. Talk to your doctor about whether you can drink any alcohol. · Try to limit how much sodium you eat to less than 2,300 milligrams (mg) a day. Your doctor may ask you to try to eat less than 1,500 mg a day. · Eat plenty of fruits (such as bananas and oranges), vegetables, legumes, whole grains, and low-fat dairy products. · Lower the amount of saturated fat in your diet. Saturated fat is found in animal products such as milk, cheese, and meat. Limiting these foods may help you lose weight and also lower your risk for heart disease. · Do not smoke. Smoking increases your risk for heart attack and stroke. If you need help quitting, talk to your doctor about stop-smoking programs and medicines. These can increase your chances of quitting for good. When should you call for help? Call 911 anytime you think you may need emergency care. This may mean having symptoms that suggest that your blood pressure is causing a serious heart or blood vessel problem. Your blood pressure may be over 180/110. ? For example, call 911 if:  ? · You have symptoms of a heart attack. These may include:  ¨ Chest pain or pressure, or a strange feeling in the chest.  ¨ Sweating. ¨ Shortness of breath. ¨ Nausea or vomiting. ¨ Pain, pressure, or a strange feeling in the back, neck, jaw, or upper belly or in one or both shoulders or arms. ¨ Lightheadedness or sudden weakness. ¨ A fast or irregular heartbeat. ? · You have symptoms of a stroke. These may include:  ¨ Sudden numbness, tingling, weakness, or loss of movement in your face, arm, or leg, especially on only one side of your body. ¨ Sudden vision changes. ¨ Sudden trouble speaking. ¨ Sudden confusion or trouble understanding simple statements. ¨ Sudden problems with walking or balance.   ¨ A sudden, severe headache that is different from past headaches. ? · You have severe back or belly pain. ?Do not wait until your blood pressure comes down on its own. Get help right away. ?Call your doctor now or seek immediate care if:  ? · Your blood pressure is much higher than normal (such as 180/110 or higher), but you don't have symptoms. ? · You think high blood pressure is causing symptoms, such as:  ¨ Severe headache. ¨ Blurry vision. ? Watch closely for changes in your health, and be sure to contact your doctor if:  ? · Your blood pressure measures 140/90 or higher at least 2 times. That means the top number is 140 or higher or the bottom number is 90 or higher, or both. ? · You think you may be having side effects from your blood pressure medicine. ? · Your blood pressure is usually normal, but it goes above normal at least 2 times. Where can you learn more? Go to http://steffany-[place].info/. Enter M742 in the search box to learn more about \"High Blood Pressure: Care Instructions. \"  Current as of: September 21, 2016  Content Version: 11.4  © 7550-4240 [employer]. Care instructions adapted under license by [employer] (which disclaims liability or warranty for this information). If you have questions about a medical condition or this instruction, always ask your healthcare professional. [address] any warranty or liability for your use of this information. and appropriate educational materials and side effects teaching were provided.   ___________________________________________________________________________________________________________________________________

## 2018-04-30 NOTE — PROGRESS NOTES
[facility]      CARDIOLOGY PROGRESS NOTE  RECS:      1. Hypertensive urgency- resolved BP improved  on current meds. Continue Hydralazine, norvasc, losartan, aldactone. Echo revealed preserved EF%   2. Chest pain - mild ST segment depression in inferolateral leads. Negative troponin. Continue aspirin, statin, unable to tolerate BB due to bradycardia. Plans for stress test Today to assess CAD  3. Dyslipidemia- not at target  . Continue  Atorvastatin 40 mg  4. Non compliance with medications  5. Hx of Asthma     No ischemia on stress test. Has abnormal EKG  No further chest pain- discussed with patient regarding further management- prefers medical management  Continue current meds  Will f/u in clinic      Echo 4/28/18  Procedure information: This was a technically difficult study. Echocardiographic views were limited by poor acoustic  window availability due to body habitus. Intravenous contrast (Definity) was   administered. Left ventricle: Size was normal. Systolic function was by visual assessment. Ejection fraction was estimated in the  range of 55 % to 60 %. Suboptimal endocardial visualization limits wall   motion analysis. Wall thickness was mildly  increased. Right ventricle: Systolic pressure was within the normal range. Estimated   peak pressure was 20 mmHg.     I  ASSESSMENT:  Hospital Problems  Never Reviewed          Codes Class Noted POA    HTN (hypertension), malignant ICD-10-CM: I10  ICD-9-CM: 401.0  4/26/2018 Unknown        Hypertensive urgency ICD-10-CM: I16.0  ICD-9-CM: 401.9  4/26/2018 Unknown                SUBJECTIVE:    No CP or SOB    OBJECTIVE:    VS:   Visit Vitals    /69 (BP Patient Position: Supine)    Pulse 87    Temp 98.3 °F (36.8 °C)    Resp 19    Ht 5' 6\" (1.676 m)    Wt 104.9 kg (231 lb 3.2 oz)    SpO2 96%    BMI 37.32 kg/m2         Intake/Output Summary (Last 24 hours) at 04/30/18 7980  Last data filed at 04/30/18 3374 Gross per 24 hour   Intake              960 ml   Output              400 ml   Net              560 ml     TELE: normal sinus rhythm    General: alert and in no apparent distress  HENT: Normocephalic, atraumatic. Normal external eye. Neck :  JVD difficult to assess due to obesity  Cardiac:  regular rate and rhythm, systolic murmur: holosystolic 2/6, blowing at 2nd right intercostal space  Lungs: clear to auscultation bilaterally  Abdomen: Soft, nontender, no masses  Extremities:  No c/c/e, peripheral pulses present      Labs: Results:       Chemistry Recent Labs      04/30/18   0334   GLU  141*   NA  140   K  3.7   CL  105   CO2  30   BUN  16   CREA  1.33*   CA  9.0   AGAP  5   BUCR  12      CBC w/Diff Recent Labs      04/30/18   0334   WBC  7.9   RBC  5.09   HGB  13.5   HCT  40.4   PLT  275   GRANS  63   LYMPH  27   EOS  1      Cardiac Enzymes Recent Labs      04/27/18 2015 04/27/18   1151   CPK  180  194*   CKND1  1.1  0.9      Coagulation No results for input(s): PTP, INR, APTT in the last 72 hours. No lab exists for component: INREXT, INREXT    Lipid Panel Lab Results   Component Value Date/Time    Cholesterol, total 245 (H) 04/27/2018 01:10 AM    HDL Cholesterol 48 04/27/2018 01:10 AM    LDL, calculated 169.4 (H) 04/27/2018 01:10 AM    VLDL, calculated 27.6 04/27/2018 01:10 AM    Triglyceride 138 04/27/2018 01:10 AM    CHOL/HDL Ratio 5.1 (H) 04/27/2018 01:10 AM      BNP No results for input(s): BNPP in the last 72 hours. Liver Enzymes No results for input(s): TP, ALB, TBIL, AP, SGOT, GPT in the last 72 hours. No lab exists for component: DBIL   Thyroid Studies Lab Results   Component Value Date/Time    TSH 1.14 04/26/2018 02:55 PM              MARCE Lake supervised    I have independently evaluated and examined the patient. All relevant labs and testing data's are reviewed. Care plan discussed and updated after review.     Reagan Miller MD

## 2018-04-30 NOTE — PROGRESS NOTES
Transition of Care (ALL) Plan:    Patient has good family support and motivated to follow up with PCP. Patient is completely independ and still drives. ALL Transportation:       How is patient being transported at discharge? Family     When? [phone]     Is transport scheduled? unknown    Follow-up appointment and transportation:     PCP? Marivel Barclay ON 5/1 AT 310pm     Specialist? dR Maki ON 5/31 AT 230pm     Time/Date? Who is transporting to the follow-up appointment? Patient      Is transport for follow up appointment scheduled? N/A    Communication plan (with patient/family): Who is being called? Patient       What number(s) is to be used? [phone]      What service provider is calling for [facility]? When are they calling? Readmission Risk?   (Green/Low; Yellow/Moderate; Red/High): Abdulkadir Rush

## 2018-04-30 NOTE — PROGRESS NOTES
Reason for Admission:   High B/P                   RRAT Score:   8                  Plan for utilizing home health:      Does not plan on using HH                    Likelihood of Readmission:  Green/low                         Transition of Care Plan: This writer confirmed with the [phone] (c) that although she lives alone she has a brother and daughter who live within 10 minutes of her. The only DME she has at home is her own B/P machine. Patient states that this is her first hospitalization for her elevated B/P  Medicare important message signed by patient, received copy, and original on chart.     Care Management Interventions  Mode of Transport at Discharge:  (brother or her daughter)  Confirm Follow Up Transport: Family  Plan discussed with Pt/Family/Caregiver: Yes  Discharge Location  Discharge Placement: Home

## 2018-05-01 LAB
ATTENDING PHYSICIAN, CST07: NORMAL
DIAGNOSIS, 93000: NORMAL
DUKE TM SCORE RESULT, CST14: NORMAL
DUKE TREADMILL SCORE, CST13: NORMAL
ECG INTERP BEFORE EX, CST11: NORMAL
ECG INTERP DURING EX, CST12: NORMAL
FUNCTIONAL CAPACITY, CST17: NORMAL
KNOWN CARDIAC CONDITION, CST08: NORMAL
MAX. DIASTOLIC BP, CST04: 67 MMHG
MAX. HEART RATE, CST05: 112 BPM
MAX. SYSTOLIC BP, CST03: 153 MMHG
OVERALL BP RESPONSE TO EXERCISE, CST16: NORMAL
OVERALL HR RESPONSE TO EXERCISE, CST15: NORMAL
PEAK EX METS, CST10: 1 METS
PROTOCOL NAME, CST01: NORMAL
TEST INDICATION, CST09: NORMAL

## 2018-05-01 NOTE — DISCHARGE SUMMARY
[address] [facility] [address] [facility] [address] [facility]  MR#: [id number]  : 1950  ACCOUNT #: [de-identified]   ADMIT DATE: 2018  DISCHARGE DATE: 2018    DATE OF DISCHARGE:  2018     DISCHARGE DIAGNOSES:  1. Hypertensive urgency. 2.  Chest discomfort with electrocardiogram changes/atypical chest pain. 3.  Dyslipidemia. 4.  Gastroesophageal reflux disease. SERVICE:  The patient was admitted to the Hospice Service. CONSULTANTS:  Dr. Demetrio Castaneda was consulted for Cardiology. PROCEDURES:  Patient underwent nuclear stress test today, as discussed with Cardiology, no ischemia stress test despite abnormal EKG. HISTORY OF PRESENT ILLNESS:  Please refer to the admission H and P.  Briefly, the patient is a 79-year-old female with a history significant for the above, came to the emergency department complaining of elevated blood pressure. She went to go see her primary care physician on the day prior to presentation, was noted to have elevated blood pressure, she was on beta blocker and ARB therapy, and the patient gave her some hydralazine in the office and wrote for a new hydralazine prescription. On the morning of presentation, she woke up lightheaded, she took her blood pressure and this was noted to be high as well. She took her morning medication and came to the emergency department. In the emergency department, she was given 200 mg of hydralazine by mouth. Her pressure was noted to be 220/71. She was asymptomatic when we saw her. Admission labs included a normal metabolic panel, normal CBC as well, TSH of 1.14. She had an EKG performed, demonstrating sinus bradycardia, in the 40s. She had an inverted T-wave in lead I, aVL, with no prior to compare to. HOSPITAL COURSE BY PROBLEM:  1. Hypertensive urgency:  Improved with medications as listed below, and her pressures have been better, systolic in the 140 to 557 range.   She had a renal duplex study performed, this showed no significant renal artery stenosis identified. Cardiology was consulted. See below. 2.  Chest discomfort with EKG changes:  Enzymes negative, on aspirin, statin therapy, in addition to her beta blocker dose. Case was discussed with cardiology, she underwent stress test today and as discussed with cardiology, it appears that she had some inferolateral ST segment depression. Biomarkers again negative. Stress test negative for ischemia despite her abnormal EKG. She has been cleared for discharge by Cardiology with medical therapy as per patient's wishes. She had a 2D echocardiogram performed here showing an ejection fraction of 55% to 60%, suboptimal endocardial visualization limited wall motion analysis. We will be discharging home with the medications as listed below. 3.  Dyslipidemia, on statin therapy. 4.  GERD is on Pepcid and Maalox as needed. PHYSICAL EXAMINATION:  GENERAL:  Today, patient denies any fevers, chills, nausea, vomiting, chest pain, shortness of breath or palpitations, edema, dizziness, lightheadedness, any headaches. CARDIOVASCULAR:  She has a regular heart. LUNGS:  Clear. ABDOMEN:  Benign. EXTREMITIES:  No calf tenderness or edema. LABORATORY DATA:  Today include a normal CBC, normal metabolic panel with a BUN and creatinine of 16/1. 33.    DISPOSITION:  Home. DISCHARGE MEDICATIONS:  She does not have her full medicine list, cannot recall all of them. She states that she typically gets her medicines via [facility]. She stated that she could have a prescription sent to [facility] on [address]. I have sent all new prescriptions as follows:  1. Norvasc 10 mg p.o. every day. 2.  Aspirin 81 mg p.o. every day. 3.  Lipitor 40 mg p.o. q.p.m.    4.  Pepcid 20 mg p.o. b.i.d.    5.  Hydralazine 100 mg p.o. t.i.d.    6.  Cozaar 100 mg p.o. every day. 7.  Minoxidil 2.5 mg p.o. every day.   8.  Sublingual nitroglycerin 0.4 mg under the tongue every 5 minutes as needed for chest pain. 9.  Aldactone 25 mg p.o. every day. FOLLOW UP:    1. With Nurse Practitioner, Theodore Staples with [facility]. 2.  With cardiology in 3-4 weeks. Total time on discharge, greater than 30 minutes.       Marsha Mcburney, MD       PDP/CN  D: 04/30/2018 14:49     T: 05/01/2018 11:19  JOB #: [id number]  CC: Fozia Emmanuel NP

## 2018-12-18 ENCOUNTER — HOSPITAL ENCOUNTER (OUTPATIENT)
Dept: MAMMOGRAPHY | Age: 68
Discharge: HOME | End: 2018-12-18
Attending: NURSE PRACTITIONER
Payer: MEDICARE

## 2018-12-18 DIAGNOSIS — Z12.31 VISIT FOR SCREENING MAMMOGRAM: ICD-10-CM

## 2018-12-18 PROCEDURE — 77063 BREAST TOMOSYNTHESIS BI: CPT

## 2020-03-10 ENCOUNTER — HOSPITAL ENCOUNTER (OUTPATIENT)
Dept: MAMMOGRAPHY | Age: 70
Discharge: HOME | End: 2020-03-10
Attending: INTERNAL MEDICINE
Payer: MEDICARE

## 2020-03-10 DIAGNOSIS — Z12.31 SCREENING MAMMOGRAM, ENCOUNTER FOR: ICD-10-CM

## 2020-03-10 PROCEDURE — 77063 BREAST TOMOSYNTHESIS BI: CPT

## 2022-03-18 PROBLEM — I16.0 HYPERTENSIVE URGENCY: Status: ACTIVE | Noted: 2018-04-26

## 2022-03-19 PROBLEM — I10 HTN (HYPERTENSION), MALIGNANT: Status: ACTIVE | Noted: 2018-04-26

## 2023-05-17 ENCOUNTER — HOSPITAL ENCOUNTER (OUTPATIENT)
Facility: HOSPITAL | Age: 73
Discharge: HOME | End: 2023-05-20
Payer: COMMERCIAL

## 2023-05-17 DIAGNOSIS — Z12.31 VISIT FOR SCREENING MAMMOGRAM: ICD-10-CM

## 2023-05-17 DIAGNOSIS — M81.0 AGE-RELATED OSTEOPOROSIS WITHOUT CURRENT PATHOLOGICAL FRACTURE: ICD-10-CM

## 2023-05-17 PROCEDURE — 77063 BREAST TOMOSYNTHESIS BI: CPT

## 2023-05-17 PROCEDURE — 77080 DXA BONE DENSITY AXIAL: CPT

## 2025-04-29 ENCOUNTER — TRANSCRIBE ORDERS (OUTPATIENT)
Facility: HOSPITAL | Age: 75
End: 2025-04-29

## 2025-04-29 DIAGNOSIS — Z78.0 POSTMENOPAUSAL: Primary | ICD-10-CM

## 2025-04-29 DIAGNOSIS — Z12.31 VISIT FOR SCREENING MAMMOGRAM: Primary | ICD-10-CM

## 2025-06-19 ENCOUNTER — HOSPITAL ENCOUNTER (OUTPATIENT)
Facility: HOSPITAL | Age: 75
Discharge: HOME | End: 2025-06-22
Payer: MEDICARE

## 2025-06-19 VITALS — HEIGHT: 63 IN | BODY MASS INDEX: 38.98 KG/M2 | WEIGHT: 220 LBS

## 2025-06-19 DIAGNOSIS — Z12.31 VISIT FOR SCREENING MAMMOGRAM: ICD-10-CM

## 2025-06-19 PROCEDURE — 77063 BREAST TOMOSYNTHESIS BI: CPT

## 2025-07-09 ENCOUNTER — HOSPITAL ENCOUNTER (OUTPATIENT)
Facility: HOSPITAL | Age: 75
Discharge: HOME | End: 2025-07-12
Payer: MEDICARE

## 2025-07-09 DIAGNOSIS — Z78.0 POSTMENOPAUSAL: ICD-10-CM

## 2025-07-09 PROCEDURE — 77080 DXA BONE DENSITY AXIAL: CPT
